# Patient Record
Sex: MALE | Race: WHITE | Employment: OTHER
[De-identification: names, ages, dates, MRNs, and addresses within clinical notes are randomized per-mention and may not be internally consistent; named-entity substitution may affect disease eponyms.]

---

## 2017-01-24 ENCOUNTER — OFFICE VISIT (OUTPATIENT)
Dept: NEUROLOGY | Facility: CLINIC | Age: 62
End: 2017-01-24

## 2017-01-24 VITALS
HEART RATE: 82 BPM | WEIGHT: 158.9 LBS | DIASTOLIC BLOOD PRESSURE: 78 MMHG | SYSTOLIC BLOOD PRESSURE: 112 MMHG | BODY MASS INDEX: 24.94 KG/M2 | HEIGHT: 67 IN

## 2017-01-24 DIAGNOSIS — G25.81 RESTLESS LEG SYNDROME, CONTROLLED: ICD-10-CM

## 2017-01-24 PROCEDURE — G8484 FLU IMMUNIZE NO ADMIN: HCPCS | Performed by: PSYCHIATRY & NEUROLOGY

## 2017-01-24 PROCEDURE — 99214 OFFICE O/P EST MOD 30 MIN: CPT | Performed by: PSYCHIATRY & NEUROLOGY

## 2017-01-24 PROCEDURE — 3017F COLORECTAL CA SCREEN DOC REV: CPT | Performed by: PSYCHIATRY & NEUROLOGY

## 2017-01-24 PROCEDURE — G8427 DOCREV CUR MEDS BY ELIG CLIN: HCPCS | Performed by: PSYCHIATRY & NEUROLOGY

## 2017-01-24 PROCEDURE — G8420 CALC BMI NORM PARAMETERS: HCPCS | Performed by: PSYCHIATRY & NEUROLOGY

## 2017-01-24 PROCEDURE — 4004F PT TOBACCO SCREEN RCVD TLK: CPT | Performed by: PSYCHIATRY & NEUROLOGY

## 2017-01-24 RX ORDER — ROPINIROLE 0.5 MG/1
TABLET, FILM COATED ORAL
Qty: 150 TABLET | Refills: 5 | Status: SHIPPED | OUTPATIENT
Start: 2017-01-24 | End: 2017-08-09 | Stop reason: SDUPTHER

## 2017-01-24 RX ORDER — TEMAZEPAM 30 MG/1
CAPSULE ORAL
Qty: 30 CAPSULE | Refills: 2 | Status: SHIPPED | OUTPATIENT
Start: 2017-01-24 | End: 2017-06-23 | Stop reason: SDUPTHER

## 2017-01-24 RX ORDER — TRAZODONE HYDROCHLORIDE 100 MG/1
TABLET ORAL
Qty: 90 TABLET | Refills: 5 | Status: SHIPPED | OUTPATIENT
Start: 2017-01-24 | End: 2017-07-28 | Stop reason: SDUPTHER

## 2017-01-24 ASSESSMENT — ENCOUNTER SYMPTOMS
BACK PAIN: 1
RESPIRATORY NEGATIVE: 1
GASTROINTESTINAL NEGATIVE: 1
EYES NEGATIVE: 1

## 2017-08-09 ENCOUNTER — OFFICE VISIT (OUTPATIENT)
Dept: NEUROLOGY | Age: 62
End: 2017-08-09
Payer: COMMERCIAL

## 2017-08-09 VITALS
SYSTOLIC BLOOD PRESSURE: 133 MMHG | WEIGHT: 166 LBS | BODY MASS INDEX: 26.06 KG/M2 | HEIGHT: 67 IN | DIASTOLIC BLOOD PRESSURE: 87 MMHG | HEART RATE: 83 BPM

## 2017-08-09 DIAGNOSIS — G25.81 RESTLESS LEG SYNDROME, CONTROLLED: Primary | ICD-10-CM

## 2017-08-09 DIAGNOSIS — M54.40 CHRONIC BILATERAL LOW BACK PAIN WITH SCIATICA, SCIATICA LATERALITY UNSPECIFIED: ICD-10-CM

## 2017-08-09 DIAGNOSIS — G89.29 CHRONIC BILATERAL LOW BACK PAIN WITH SCIATICA, SCIATICA LATERALITY UNSPECIFIED: ICD-10-CM

## 2017-08-09 DIAGNOSIS — G47.00 INSOMNIA, UNSPECIFIED TYPE: ICD-10-CM

## 2017-08-09 PROCEDURE — 3017F COLORECTAL CA SCREEN DOC REV: CPT | Performed by: PSYCHIATRY & NEUROLOGY

## 2017-08-09 PROCEDURE — G8419 CALC BMI OUT NRM PARAM NOF/U: HCPCS | Performed by: PSYCHIATRY & NEUROLOGY

## 2017-08-09 PROCEDURE — 99214 OFFICE O/P EST MOD 30 MIN: CPT | Performed by: PSYCHIATRY & NEUROLOGY

## 2017-08-09 PROCEDURE — 4004F PT TOBACCO SCREEN RCVD TLK: CPT | Performed by: PSYCHIATRY & NEUROLOGY

## 2017-08-09 PROCEDURE — G8427 DOCREV CUR MEDS BY ELIG CLIN: HCPCS | Performed by: PSYCHIATRY & NEUROLOGY

## 2017-08-09 RX ORDER — ALBUTEROL SULFATE 90 UG/1
2 AEROSOL, METERED RESPIRATORY (INHALATION) PRN
COMMUNITY

## 2017-08-09 RX ORDER — ROPINIROLE 0.5 MG/1
TABLET, FILM COATED ORAL
Qty: 150 TABLET | Refills: 5 | Status: SHIPPED | OUTPATIENT
Start: 2017-08-09 | End: 2018-02-05 | Stop reason: SDUPTHER

## 2017-08-09 RX ORDER — TRAZODONE HYDROCHLORIDE 100 MG/1
TABLET ORAL
Qty: 90 TABLET | Refills: 5 | Status: SHIPPED | OUTPATIENT
Start: 2017-08-09 | End: 2018-03-06 | Stop reason: SDUPTHER

## 2017-08-09 ASSESSMENT — ENCOUNTER SYMPTOMS
RESPIRATORY NEGATIVE: 1
EYES NEGATIVE: 1
BACK PAIN: 1
GASTROINTESTINAL NEGATIVE: 1

## 2017-08-28 RX ORDER — TEMAZEPAM 30 MG/1
CAPSULE ORAL
Qty: 30 CAPSULE | Refills: 2 | OUTPATIENT
Start: 2017-08-28 | End: 2017-11-27 | Stop reason: SDUPTHER

## 2017-11-27 RX ORDER — TEMAZEPAM 30 MG/1
CAPSULE ORAL
Qty: 30 CAPSULE | Refills: 2 | OUTPATIENT
Start: 2017-11-27 | End: 2018-02-23 | Stop reason: SDUPTHER

## 2018-02-05 DIAGNOSIS — G25.81 RESTLESS LEG SYNDROME, CONTROLLED: ICD-10-CM

## 2018-02-05 RX ORDER — ROPINIROLE 0.5 MG/1
TABLET, FILM COATED ORAL
Qty: 150 TABLET | Refills: 4 | Status: SHIPPED | OUTPATIENT
Start: 2018-02-05 | End: 2018-07-23 | Stop reason: CLARIF

## 2018-02-23 RX ORDER — TEMAZEPAM 30 MG/1
CAPSULE ORAL
Qty: 30 CAPSULE | Refills: 1 | Status: SHIPPED | OUTPATIENT
Start: 2018-02-23 | End: 2018-04-22 | Stop reason: SDUPTHER

## 2018-02-23 NOTE — TELEPHONE ENCOUNTER
Patient is calling for their monthly Tempazepam prescription. Last OARRS report was done 11/27/2017. Rx will be sent electronically by physician to the pharmacy. Yeyo's next appointment 3/6/2018.

## 2018-03-06 ENCOUNTER — OFFICE VISIT (OUTPATIENT)
Dept: NEUROLOGY | Age: 63
End: 2018-03-06
Payer: COMMERCIAL

## 2018-03-06 VITALS
HEART RATE: 92 BPM | HEIGHT: 67 IN | SYSTOLIC BLOOD PRESSURE: 134 MMHG | DIASTOLIC BLOOD PRESSURE: 87 MMHG | WEIGHT: 165.8 LBS | BODY MASS INDEX: 26.02 KG/M2

## 2018-03-06 DIAGNOSIS — G47.00 INSOMNIA, UNSPECIFIED TYPE: ICD-10-CM

## 2018-03-06 DIAGNOSIS — G25.81 RESTLESS LEG SYNDROME, CONTROLLED: Primary | ICD-10-CM

## 2018-03-06 DIAGNOSIS — G89.29 CHRONIC BILATERAL LOW BACK PAIN WITH SCIATICA, SCIATICA LATERALITY UNSPECIFIED: ICD-10-CM

## 2018-03-06 DIAGNOSIS — M54.40 CHRONIC BILATERAL LOW BACK PAIN WITH SCIATICA, SCIATICA LATERALITY UNSPECIFIED: ICD-10-CM

## 2018-03-06 PROCEDURE — G8484 FLU IMMUNIZE NO ADMIN: HCPCS | Performed by: PSYCHIATRY & NEUROLOGY

## 2018-03-06 PROCEDURE — 3017F COLORECTAL CA SCREEN DOC REV: CPT | Performed by: PSYCHIATRY & NEUROLOGY

## 2018-03-06 PROCEDURE — G8427 DOCREV CUR MEDS BY ELIG CLIN: HCPCS | Performed by: PSYCHIATRY & NEUROLOGY

## 2018-03-06 PROCEDURE — G8419 CALC BMI OUT NRM PARAM NOF/U: HCPCS | Performed by: PSYCHIATRY & NEUROLOGY

## 2018-03-06 PROCEDURE — 4004F PT TOBACCO SCREEN RCVD TLK: CPT | Performed by: PSYCHIATRY & NEUROLOGY

## 2018-03-06 PROCEDURE — 99214 OFFICE O/P EST MOD 30 MIN: CPT | Performed by: PSYCHIATRY & NEUROLOGY

## 2018-03-06 RX ORDER — ROPINIROLE 1 MG/1
TABLET, FILM COATED ORAL
Qty: 120 TABLET | Refills: 3 | Status: SHIPPED | OUTPATIENT
Start: 2018-03-06 | End: 2018-07-23 | Stop reason: CLARIF

## 2018-03-06 RX ORDER — TRAZODONE HYDROCHLORIDE 100 MG/1
TABLET ORAL
Qty: 90 TABLET | Refills: 5 | Status: SHIPPED | OUTPATIENT
Start: 2018-03-06 | End: 2018-09-02 | Stop reason: SDUPTHER

## 2018-03-06 ASSESSMENT — ENCOUNTER SYMPTOMS
ALLERGIC/IMMUNOLOGIC NEGATIVE: 1
GASTROINTESTINAL NEGATIVE: 1
EYES NEGATIVE: 1
BACK PAIN: 1
COUGH: 1

## 2018-03-06 NOTE — PROGRESS NOTES
Subjective:      Patient ID: Mckenna Thomas is a 60 y.o. . HPI    Active Problem restless legs on requip along with intiating and maintenance insomnia on desyrel and cojoint restoril  . There is chronic low back pain with prior three level instrumented fusion seeing pain clinic . The condition he reports that over the lasted three months restless legs have again aggravated during the day upon awakenign in morning taking requip 0.5 mg mg at 7:30 AM , 2:30 PM and then 3 tablets at bedtime . He reports that during night restless are doing well with restless legs being more of an issue during the day . He is tolerating requip well . He is going to bed at 8:30 PM falling asleep within 1 hour on desyrel and restoril sleeping to 5:30 AM . There will be two awakenings at night able to fall back asleep . During waking day he is rested with no fatigue or sleepiness. He reports that low back pain is moderate with large improvement with spinal cord stimulator being off tramadol being followed by pain coinic being off tramadol  . Significant medications desyrel 300 mg po qhs, restoril 30 mg po qhs ,requip 0.5 mg at 12 noon , 6PM and 1.5  mg po qhs . Testing polysomnogram apnea hypopnea index 1 episode per hour , December 2005. MRI of Head normal, January 2006 . MRI of thoracic spine with chronic compression of T5 and T8 vertebral bodies with prior augmentation , March 2016 .  MRI lumbar spine status post L4 and L5 level interbody fusions and prior laminectomies with mild to moderate central canal stenosis at L3-L4 level , June 2015      Past Medical History:   Diagnosis Date    CAD (coronary artery disease)     Depression     GERD (gastroesophageal reflux disease)     Hypercholesterolemia     Insomnia, persistent     LBP (low back pain)     Lumbar disc displacement without myelopathy     Ulcer ([facility] [address]        Past Surgical History:   Procedure Laterality Date    ABDOMEN SURGERY  2002    GI bleed, ulcers    BACK neve 2. Whitney intact to confrontation  Cranial nerve 3,4 and 6. Extraocular movements intact. Pupils equal round and reactive to light  Cranial Nerve 7. Normal exam  Sensation . Intact to pin prick  Deep tendon reflexes intact   Assessment:       1. Restless leg syndrome, controlled    2. Chronic bilateral low back pain with sciatica, sciatica laterality unspecified    3.  Insomnia, unspecified type    Will have patient increase requip to 1 mg q 7:30 AM ,1mg po q 2 PM along with 2 mg po qhs with increasing restless legs complaint continuing on current desyrel and restoril       Plan:      As above

## 2018-03-07 NOTE — COMMUNICATION BODY
Subjective:      Patient ID: Arlette Barriga is a 60 y.o. . HPI    Active Problem restless legs on requip along with intiating and maintenance insomnia on desyrel and cojoint restoril  . There is chronic low back pain with prior three level instrumented fusion seeing pain clinic . The condition he reports that over the lasted three months restless legs have again aggravated during the day upon awakenign in morning taking requip 0.5 mg mg at 7:30 AM , 2:30 PM and then 3 tablets at bedtime . He reports that during night restless are doing well with restless legs being more of an issue during the day . He is tolerating requip well . He is going to bed at 8:30 PM falling asleep within 1 hour on desyrel and restoril sleeping to 5:30 AM . There will be two awakenings at night able to fall back asleep . During waking day he is rested with no fatigue or sleepiness. He reports that low back pain is moderate with large improvement with spinal cord stimulator being off tramadol being followed by pain coinic being off tramadol  . Significant medications desyrel 300 mg po qhs, restoril 30 mg po qhs ,requip 0.5 mg at 12 noon , 6PM and 1.5  mg po qhs . Testing polysomnogram apnea hypopnea index 1 episode per hour , December 2005. MRI of Head normal, January 2006 . MRI of thoracic spine with chronic compression of T5 and T8 vertebral bodies with prior augmentation , March 2016 .  MRI lumbar spine status post L4 and L5 level interbody fusions and prior laminectomies with mild to moderate central canal stenosis at L3-L4 level , June 2015      Past Medical History:   Diagnosis Date    CAD (coronary artery disease)     Depression     GERD (gastroesophageal reflux disease)     Hypercholesterolemia     Insomnia, persistent     LBP (low back pain)     Lumbar disc displacement without myelopathy     Ulcer ([facility] [address]        Past Surgical History:   Procedure Laterality Date    ABDOMEN SURGERY  2002    GI bleed, ulcers    BACK neve 2. Whitney intact to confrontation  Cranial nerve 3,4 and 6. Extraocular movements intact. Pupils equal round and reactive to light  Cranial Nerve 7. Normal exam  Sensation . Intact to pin prick  Deep tendon reflexes intact   Assessment:       1. Restless leg syndrome, controlled    2. Chronic bilateral low back pain with sciatica, sciatica laterality unspecified    3.  Insomnia, unspecified type    Will have patient increase requip to 1 mg q 7:30 AM ,1mg po q 2 PM along with 2 mg po qhs with increasing restless legs complaint continuing on current desyrel and restoril       Plan:      As above

## 2018-04-23 RX ORDER — TEMAZEPAM 30 MG/1
CAPSULE ORAL
Qty: 30 CAPSULE | Refills: 0 | Status: SHIPPED | OUTPATIENT
Start: 2018-04-23 | End: 2018-05-23

## 2018-05-17 ENCOUNTER — OFFICE VISIT (OUTPATIENT)
Dept: NEUROLOGY | Age: 63
End: 2018-05-17
Payer: COMMERCIAL

## 2018-05-17 VITALS
BODY MASS INDEX: 26.1 KG/M2 | SYSTOLIC BLOOD PRESSURE: 150 MMHG | HEIGHT: 66 IN | HEART RATE: 85 BPM | DIASTOLIC BLOOD PRESSURE: 91 MMHG | WEIGHT: 162.4 LBS

## 2018-05-17 DIAGNOSIS — G25.81 RESTLESS LEG SYNDROME, CONTROLLED: Primary | ICD-10-CM

## 2018-05-17 DIAGNOSIS — G47.00 INSOMNIA, UNSPECIFIED TYPE: ICD-10-CM

## 2018-05-17 DIAGNOSIS — M54.40 CHRONIC BILATERAL LOW BACK PAIN WITH SCIATICA, SCIATICA LATERALITY UNSPECIFIED: ICD-10-CM

## 2018-05-17 DIAGNOSIS — G89.29 CHRONIC BILATERAL LOW BACK PAIN WITH SCIATICA, SCIATICA LATERALITY UNSPECIFIED: ICD-10-CM

## 2018-05-17 PROCEDURE — 3017F COLORECTAL CA SCREEN DOC REV: CPT | Performed by: PSYCHIATRY & NEUROLOGY

## 2018-05-17 PROCEDURE — G8419 CALC BMI OUT NRM PARAM NOF/U: HCPCS | Performed by: PSYCHIATRY & NEUROLOGY

## 2018-05-17 PROCEDURE — 4004F PT TOBACCO SCREEN RCVD TLK: CPT | Performed by: PSYCHIATRY & NEUROLOGY

## 2018-05-17 PROCEDURE — G8427 DOCREV CUR MEDS BY ELIG CLIN: HCPCS | Performed by: PSYCHIATRY & NEUROLOGY

## 2018-05-17 PROCEDURE — 99214 OFFICE O/P EST MOD 30 MIN: CPT | Performed by: PSYCHIATRY & NEUROLOGY

## 2018-05-17 RX ORDER — ROPINIROLE 2 MG/1
TABLET, FILM COATED ORAL
Qty: 90 TABLET | Refills: 5 | Status: SHIPPED | OUTPATIENT
Start: 2018-05-17 | End: 2018-10-11 | Stop reason: SDUPTHER

## 2018-05-17 RX ORDER — TEMAZEPAM 30 MG/1
CAPSULE ORAL
Qty: 30 CAPSULE | Refills: 0 | Status: SHIPPED | OUTPATIENT
Start: 2018-05-17 | End: 2018-06-19 | Stop reason: SDUPTHER

## 2018-05-17 ASSESSMENT — ENCOUNTER SYMPTOMS
EYES NEGATIVE: 1
BACK PAIN: 1
RESPIRATORY NEGATIVE: 1
ALLERGIC/IMMUNOLOGIC NEGATIVE: 1
GASTROINTESTINAL NEGATIVE: 1

## 2018-06-19 DIAGNOSIS — G47.00 INSOMNIA, UNSPECIFIED TYPE: Primary | ICD-10-CM

## 2018-06-19 RX ORDER — TEMAZEPAM 30 MG/1
CAPSULE ORAL
Qty: 30 CAPSULE | Refills: 0 | Status: SHIPPED | OUTPATIENT
Start: 2018-06-19 | End: 2018-07-19 | Stop reason: SDUPTHER

## 2018-07-19 DIAGNOSIS — G47.00 INSOMNIA, UNSPECIFIED TYPE: ICD-10-CM

## 2018-07-19 RX ORDER — TEMAZEPAM 30 MG/1
CAPSULE ORAL
Qty: 30 CAPSULE | Refills: 0 | Status: SHIPPED | OUTPATIENT
Start: 2018-07-19 | End: 2018-08-17 | Stop reason: SDUPTHER

## 2018-07-19 NOTE — TELEPHONE ENCOUNTER
Patient is calling for their monthly Tempazepam prescription. Last OARRS report was done 4/23/2018 . Rx will be sent electronically by physician to the pharmacy. Ni's next appointment is 7/23/2018.

## 2018-07-23 ENCOUNTER — OFFICE VISIT (OUTPATIENT)
Dept: NEUROLOGY | Age: 63
End: 2018-07-23
Payer: COMMERCIAL

## 2018-07-23 VITALS
WEIGHT: 162.8 LBS | HEART RATE: 98 BPM | DIASTOLIC BLOOD PRESSURE: 87 MMHG | SYSTOLIC BLOOD PRESSURE: 144 MMHG | HEIGHT: 66 IN | BODY MASS INDEX: 26.16 KG/M2

## 2018-07-23 DIAGNOSIS — G47.00 INSOMNIA, UNSPECIFIED TYPE: Primary | ICD-10-CM

## 2018-07-23 DIAGNOSIS — G89.29 CHRONIC BILATERAL LOW BACK PAIN WITH SCIATICA, SCIATICA LATERALITY UNSPECIFIED: ICD-10-CM

## 2018-07-23 DIAGNOSIS — G25.81 RESTLESS LEG SYNDROME, CONTROLLED: ICD-10-CM

## 2018-07-23 DIAGNOSIS — M54.40 CHRONIC BILATERAL LOW BACK PAIN WITH SCIATICA, SCIATICA LATERALITY UNSPECIFIED: ICD-10-CM

## 2018-07-23 PROCEDURE — 3017F COLORECTAL CA SCREEN DOC REV: CPT | Performed by: PSYCHIATRY & NEUROLOGY

## 2018-07-23 PROCEDURE — 4004F PT TOBACCO SCREEN RCVD TLK: CPT | Performed by: PSYCHIATRY & NEUROLOGY

## 2018-07-23 PROCEDURE — G8419 CALC BMI OUT NRM PARAM NOF/U: HCPCS | Performed by: PSYCHIATRY & NEUROLOGY

## 2018-07-23 PROCEDURE — G8427 DOCREV CUR MEDS BY ELIG CLIN: HCPCS | Performed by: PSYCHIATRY & NEUROLOGY

## 2018-07-23 PROCEDURE — 99214 OFFICE O/P EST MOD 30 MIN: CPT | Performed by: PSYCHIATRY & NEUROLOGY

## 2018-07-23 ASSESSMENT — ENCOUNTER SYMPTOMS
ALLERGIC/IMMUNOLOGIC NEGATIVE: 1
EYES NEGATIVE: 1
GASTROINTESTINAL NEGATIVE: 1
BACK PAIN: 1
COUGH: 1

## 2018-07-23 NOTE — COMMUNICATION BODY
Subjective:      Patient ID: Juni Martínez is a 60 y.o. . HPI    Active Problem restless legs readjusting requip with intiating and maintenance insomnia on desyrel and cojoint restoril  . There is chronic low back pain with prior three level instrumented fusion seeing pain clinic. The condition restless legs are much better with requip readjustment on 2 mg at 10 AM  3 PM and 8 PM tolerating medication well sleeping better at night with only two awakenings able to fall back asleep not havingmg restless legs at tis time either during day or night . He is tolerating requip well . He goes to bed at 9 PM falling asleep within 15 minutes on desyrel and restoril sleeping to 5 AM having two awakenings able to fall back asleep . During waking day he is rested . He continues on desyrel and restoril at bedtime unable to sleep without these medications . He reports that low back pain is moderate with large improvement with spinal cord stimulator  . Significant medications desyrel 300 mg po qhs, restoril 30 mg po qhs ,requip to 2 mg at 10AM  3PM and 8PM . Testing polysomnogram apnea hypopnea index 1 episode per hour , December 2005. MRI of Head normal, January 2006 . MRI of thoracic spine with chronic compression of T5 and T8 vertebral bodies with prior augmentation , March 2016 .  MRI lumbar spine status post L4 and L5 level interbody fusions and prior laminectomies with mild to moderate central canal stenosis at L3-L4 level , June 2015      Past Medical History:   Diagnosis Date    CAD (coronary artery disease)     Depression     GERD (gastroesophageal reflux disease)     Hypercholesterolemia     Insomnia, persistent     LBP (low back pain)     Lumbar disc displacement without myelopathy     Ulcer ([facility] [address]        Past Surgical History:   Procedure Laterality Date    ABDOMEN SURGERY  2002    GI bleed, ulcers    BACK SURGERY  2003    Dr Bharat Cesar instrumented fuson L4-L5 and L5-S1   [facility] SURGERY  December 2012 diskectomy, fusion, removal of screws    BACK SURGERY  6/2015    OTHER SURGICAL HISTORY  2002    stents       Family History   Problem Relation Age of Onset    Cancer Mother     Kidney Disease Father        Social History     Social History    Marital status:      Spouse name: N/A    Number of children: N/A    Years of education: N/A     Social History Main Topics    Smoking status: Current Every Day Smoker     Packs/day: 0.25     Types: Cigarettes    Smokeless tobacco: Never Used      Comment: smokes 6 cigarettes per day    Alcohol use 0.0 oz/week      Comment: rare    Drug use: No    Sexual activity: Not Asked     Other Topics Concern    None     Social History Narrative    None           Allergies   Allergen Reactions    Dust Mite Extract     Nsaids      Abdominal pain    Pollen Extract          Review of Systems   Constitutional: Negative. HENT: Negative. Eyes: Negative. Respiratory: Positive for cough. Cardiovascular: Negative. Gastrointestinal: Negative. Endocrine: Negative. Genitourinary: Negative. Musculoskeletal: Positive for arthralgias, back pain and gait problem. Skin: Negative. Allergic/Immunologic: Negative. Hematological: Negative. Psychiatric/Behavioral: Negative. Objective:   Physical Exam    Vitals:    07/23/18 0905   BP: (!) 144/87   Pulse: 98     Constitutional .General appearance in no acute distress    Ears/Nose/Throat. Prominent soft palate and uvula. Base of tongue large         Neck large  Respiratory . Breath sounds clear bilaterally  Cardiovascular. Regular rate and rhythm and normal heart sounds  Muskuloskeletal.Normal tone. Muscle strength grossly normal nonfocal.   Gait and station normal  Orientation and mood. Alert and oriented  Short term memory normal  Attention and concentration normal   Language and speech. Normal quality with no aphasia  Cranial neve 2. Fields intact to confrontation  Cranial nerve 3,4 and 6.

## 2018-07-23 NOTE — LETTER
Neck large  Respiratory . Breath sounds clear bilaterally  Cardiovascular. Regular rate and rhythm and normal heart sounds  Muskuloskeletal.Normal tone. Muscle strength grossly normal nonfocal.   Gait and station normal  Orientation and mood. Alert and oriented  Short term memory normal  Attention and concentration normal   Language and speech. Normal quality with no aphasia  Cranial neve 2. Fields intact to confrontation  Cranial nerve 3,4 and 6. Extraocular movements intact. Pupils equal round and reactive to light  Cranial Nerve 7. Normal exam  Sensation . Intact to pin prick  Deep tendon reflexes intact     Assessment:       1. Insomnia, unspecified type    2. Restless leg syndrome, controlled    3. Chronic bilateral low back pain with sciatica, sciatica laterality unspecified    He is to continue on current medication regimen     Plan:      As above             If you have questions, please do not hesitate to call me. I look forward to following Ni along with you.     Sincerely,        Piotr Landers MD     providers:  Thierry Carson MD  [place] [id number]  [address]: [phone]

## 2018-07-23 NOTE — PROGRESS NOTES
Subjective:      Patient ID: Pieter Meigs is a 60 y.o. . HPI    Active Problem restless legs readjusting requip with intiating and maintenance insomnia on desyrel and cojoint restoril  . There is chronic low back pain with prior three level instrumented fusion seeing pain clinic. The condition restless legs are much better with requip readjustment on 2 mg at 10 AM  3 PM and 8 PM tolerating medication well sleeping better at night with only two awakenings able to fall back asleep not havingmg restless legs at tis time either during day or night . He is tolerating requip well . He goes to bed at 9 PM falling asleep within 15 minutes on desyrel and restoril sleeping to 5 AM having two awakenings able to fall back asleep . During waking day he is rested . He continues on desyrel and restoril at bedtime unable to sleep without these medications . He reports that low back pain is moderate with large improvement with spinal cord stimulator  . Significant medications desyrel 300 mg po qhs, restoril 30 mg po qhs ,requip to 2 mg at 10AM  3PM and 8PM . Testing polysomnogram apnea hypopnea index 1 episode per hour , December 2005. MRI of Head normal, January 2006 . MRI of thoracic spine with chronic compression of T5 and T8 vertebral bodies with prior augmentation , March 2016 .  MRI lumbar spine status post L4 and L5 level interbody fusions and prior laminectomies with mild to moderate central canal stenosis at L3-L4 level , June 2015      Past Medical History:   Diagnosis Date    CAD (coronary artery disease)     Depression     GERD (gastroesophageal reflux disease)     Hypercholesterolemia     Insomnia, persistent     LBP (low back pain)     Lumbar disc displacement without myelopathy     Ulcer ([facility] [address]        Past Surgical History:   Procedure Laterality Date    ABDOMEN SURGERY  2002    GI bleed, ulcers    BACK SURGERY  2003    Dr Tsering Sierra instrumented fuson L4-L5 and L5-S1   [facility] SURGERY  December 2012 Extraocular movements intact. Pupils equal round and reactive to light  Cranial Nerve 7. Normal exam  Sensation . Intact to pin prick  Deep tendon reflexes intact     Assessment:       1. Insomnia, unspecified type    2. Restless leg syndrome, controlled    3.  Chronic bilateral low back pain with sciatica, sciatica laterality unspecified    He is to continue on current medication regimen     Plan:      As above

## 2018-08-17 DIAGNOSIS — G47.00 INSOMNIA, UNSPECIFIED TYPE: ICD-10-CM

## 2018-08-17 RX ORDER — TEMAZEPAM 30 MG/1
CAPSULE ORAL
Qty: 30 CAPSULE | Refills: 0 | Status: SHIPPED | OUTPATIENT
Start: 2018-08-17 | End: 2018-09-17 | Stop reason: SDUPTHER

## 2018-09-05 RX ORDER — TRAZODONE HYDROCHLORIDE 100 MG/1
TABLET ORAL
Qty: 90 TABLET | Refills: 4 | Status: SHIPPED | OUTPATIENT
Start: 2018-09-05 | End: 2020-07-01

## 2018-09-17 DIAGNOSIS — G47.00 INSOMNIA, UNSPECIFIED TYPE: ICD-10-CM

## 2018-09-17 RX ORDER — TEMAZEPAM 30 MG/1
CAPSULE ORAL
Qty: 30 CAPSULE | Refills: 0 | Status: SHIPPED | OUTPATIENT
Start: 2018-09-17 | End: 2018-10-11 | Stop reason: SDUPTHER

## 2018-10-11 DIAGNOSIS — G47.00 INSOMNIA, UNSPECIFIED TYPE: ICD-10-CM

## 2018-10-11 RX ORDER — ROPINIROLE 2 MG/1
TABLET, FILM COATED ORAL
Qty: 120 TABLET | Refills: 5 | Status: SHIPPED | OUTPATIENT
Start: 2018-10-11 | End: 2019-03-12 | Stop reason: SDUPTHER

## 2018-10-15 RX ORDER — TEMAZEPAM 30 MG/1
CAPSULE ORAL
Qty: 30 CAPSULE | Refills: 0 | Status: SHIPPED | OUTPATIENT
Start: 2018-10-17 | End: 2018-11-14 | Stop reason: SDUPTHER

## 2018-11-14 DIAGNOSIS — G47.00 INSOMNIA, UNSPECIFIED TYPE: ICD-10-CM

## 2018-11-14 RX ORDER — TEMAZEPAM 30 MG/1
CAPSULE ORAL
Qty: 30 CAPSULE | Refills: 0 | Status: SHIPPED | OUTPATIENT
Start: 2018-11-16 | End: 2018-12-11 | Stop reason: SDUPTHER

## 2018-12-11 DIAGNOSIS — G47.00 INSOMNIA, UNSPECIFIED TYPE: ICD-10-CM

## 2018-12-11 RX ORDER — TEMAZEPAM 30 MG/1
CAPSULE ORAL
Qty: 30 CAPSULE | Refills: 0 | Status: SHIPPED | OUTPATIENT
Start: 2018-12-16 | End: 2019-01-09 | Stop reason: SDUPTHER

## 2019-01-09 DIAGNOSIS — G47.00 INSOMNIA, UNSPECIFIED TYPE: ICD-10-CM

## 2019-01-11 RX ORDER — TEMAZEPAM 30 MG/1
CAPSULE ORAL
Qty: 30 CAPSULE | Refills: 0 | Status: SHIPPED | OUTPATIENT
Start: 2019-01-15 | End: 2019-02-08 | Stop reason: SDUPTHER

## 2019-01-23 ENCOUNTER — OFFICE VISIT (OUTPATIENT)
Dept: NEUROLOGY | Age: 64
End: 2019-01-23
Payer: COMMERCIAL

## 2019-01-23 VITALS
HEART RATE: 98 BPM | HEIGHT: 65 IN | WEIGHT: 162.4 LBS | DIASTOLIC BLOOD PRESSURE: 83 MMHG | BODY MASS INDEX: 27.06 KG/M2 | SYSTOLIC BLOOD PRESSURE: 133 MMHG

## 2019-01-23 DIAGNOSIS — G25.81 RESTLESS LEG SYNDROME, CONTROLLED: ICD-10-CM

## 2019-01-23 DIAGNOSIS — G89.29 CHRONIC BILATERAL LOW BACK PAIN WITH SCIATICA, SCIATICA LATERALITY UNSPECIFIED: ICD-10-CM

## 2019-01-23 DIAGNOSIS — G47.00 INSOMNIA, UNSPECIFIED TYPE: Primary | ICD-10-CM

## 2019-01-23 DIAGNOSIS — M54.40 CHRONIC BILATERAL LOW BACK PAIN WITH SCIATICA, SCIATICA LATERALITY UNSPECIFIED: ICD-10-CM

## 2019-01-23 PROCEDURE — G8484 FLU IMMUNIZE NO ADMIN: HCPCS | Performed by: PSYCHIATRY & NEUROLOGY

## 2019-01-23 PROCEDURE — 99214 OFFICE O/P EST MOD 30 MIN: CPT | Performed by: PSYCHIATRY & NEUROLOGY

## 2019-01-23 PROCEDURE — 3017F COLORECTAL CA SCREEN DOC REV: CPT | Performed by: PSYCHIATRY & NEUROLOGY

## 2019-01-23 PROCEDURE — 4004F PT TOBACCO SCREEN RCVD TLK: CPT | Performed by: PSYCHIATRY & NEUROLOGY

## 2019-01-23 PROCEDURE — G8419 CALC BMI OUT NRM PARAM NOF/U: HCPCS | Performed by: PSYCHIATRY & NEUROLOGY

## 2019-01-23 PROCEDURE — G8427 DOCREV CUR MEDS BY ELIG CLIN: HCPCS | Performed by: PSYCHIATRY & NEUROLOGY

## 2019-01-23 RX ORDER — TRAZODONE HYDROCHLORIDE 100 MG/1
TABLET ORAL
Qty: 90 TABLET | Refills: 5 | Status: SHIPPED | OUTPATIENT
Start: 2019-01-23 | End: 2019-07-21 | Stop reason: SDUPTHER

## 2019-01-23 ASSESSMENT — ENCOUNTER SYMPTOMS
RESPIRATORY NEGATIVE: 1
BACK PAIN: 1
ALLERGIC/IMMUNOLOGIC NEGATIVE: 1
EYES NEGATIVE: 1
GASTROINTESTINAL NEGATIVE: 1

## 2019-02-08 DIAGNOSIS — G47.00 INSOMNIA, UNSPECIFIED TYPE: ICD-10-CM

## 2019-02-12 RX ORDER — TEMAZEPAM 30 MG/1
CAPSULE ORAL
Qty: 30 CAPSULE | Refills: 0 | Status: SHIPPED | OUTPATIENT
Start: 2019-02-12 | End: 2019-03-12 | Stop reason: SDUPTHER

## 2019-03-12 DIAGNOSIS — G47.00 INSOMNIA, UNSPECIFIED TYPE: ICD-10-CM

## 2019-03-12 RX ORDER — TEMAZEPAM 30 MG/1
CAPSULE ORAL
Qty: 30 CAPSULE | Refills: 0 | Status: SHIPPED | OUTPATIENT
Start: 2019-03-12 | End: 2019-04-10 | Stop reason: SDUPTHER

## 2019-03-12 RX ORDER — ROPINIROLE 2 MG/1
TABLET, FILM COATED ORAL
Qty: 120 TABLET | Refills: 4 | Status: SHIPPED | OUTPATIENT
Start: 2019-03-12 | End: 2020-07-01

## 2019-04-10 DIAGNOSIS — G47.00 INSOMNIA, UNSPECIFIED TYPE: ICD-10-CM

## 2019-04-11 RX ORDER — TEMAZEPAM 30 MG/1
CAPSULE ORAL
Qty: 30 CAPSULE | Refills: 0 | Status: SHIPPED | OUTPATIENT
Start: 2019-04-11 | End: 2019-05-14 | Stop reason: SDUPTHER

## 2019-04-11 NOTE — TELEPHONE ENCOUNTER
HPI  August 13, 2017    HPI: Alen Matthews is a 31 year old male who complains of moderate bilateral ear pain and feeling clogged/like they need to pop onset 2 weeks ago. Pt has also had some nasal congestion. He has a hx of chronic rhinitis and usually uses Flonase but has been out of it for 2 weeks. Symptoms are constant in duration. No treatments tried. Denies fever/chills, ear drainage, HA, CP, SOB, abd pain, N/V/D, rash, or any other symptoms.     No past medical history on file.  No past surgical history on file.  Social History   Substance Use Topics     Smoking status: Never Smoker     Smokeless tobacco: Never Used     Alcohol use Yes     There is no problem list on file for this patient.    No family history on file.     Problem list, Medication list, Allergies, and Medical/Social/Surgical histories reviewed in [facility] and updated as appropriate.      Review of Systems   Constitutional: Negative for chills and fever.   HENT: Positive for ear pain. Negative for ear discharge.    Respiratory: Negative for shortness of breath.    Cardiovascular: Negative for chest pain.   Gastrointestinal: Negative for abdominal pain, diarrhea, nausea and vomiting.   Skin: Negative for rash.   Neurological: Negative for focal weakness and headaches.   All other systems reviewed and are negative.        Physical Exam   Constitutional: He is oriented to person, place, and time and well-developed, well-nourished, and in no distress.   HENT:   Head: Normocephalic and atraumatic.   Right Ear: External ear and ear canal normal. Tympanic membrane is not retracted and not bulging. A middle ear effusion is present.   Left Ear: External ear and ear canal normal. Tympanic membrane is not retracted and not bulging. A middle ear effusion is present.   Nose: Nose normal.   Mouth/Throat: Uvula is midline, oropharynx is clear and moist and mucous membranes are normal.   Cardiovascular: Normal rate, regular rhythm and normal heart sounds.   Patient is calling for their monthly Tempazepam prescription. Last OARRS report was done 3/12/2019. Rx will be sent electronically by physician to the pharmacy.   Pulmonary/Chest: Effort normal and breath sounds normal.   Musculoskeletal: Normal range of motion.   Neurological: He is alert and oriented to person, place, and time. Gait normal.   Skin: Skin is warm and dry.   Nursing note and vitals reviewed.      Vital Signs  /84  Pulse 60  Temp 97.7  F (36.5  C) (Oral)  Resp 16  Wt 256 lb (116.1 kg)  SpO2 97%     Diagnostic Test Results:  none     ASSESSMENT/PLAN      ICD-10-CM    1. Acute effusion of both middle ears H65.193       No acute otitis media, TM intact. Effusion bilaterally, recommended pt restart Flonase.    I have discussed any lab or imaging results, the patient's diagnosis, and my plan of treatment with the patient and/or family. Patient is aware to come back in if with worsening symptoms or if no relief despite treatment plan.  Patient voiced understanding and had no further questions.       Follow Up: Data Unavailable    THADDEUS Oviedo, PAShannenC  [facility]

## 2019-05-14 DIAGNOSIS — G47.00 INSOMNIA, UNSPECIFIED TYPE: ICD-10-CM

## 2019-05-14 RX ORDER — TEMAZEPAM 30 MG/1
CAPSULE ORAL
Qty: 30 CAPSULE | Refills: 0 | Status: SHIPPED | OUTPATIENT
Start: 2019-05-14 | End: 2019-06-12 | Stop reason: SDUPTHER

## 2019-06-12 DIAGNOSIS — G47.00 INSOMNIA, UNSPECIFIED TYPE: ICD-10-CM

## 2019-06-15 RX ORDER — TEMAZEPAM 30 MG/1
CAPSULE ORAL
Qty: 30 CAPSULE | Refills: 0 | Status: SHIPPED | OUTPATIENT
Start: 2019-06-15 | End: 2019-07-14

## 2019-07-16 DIAGNOSIS — G47.00 INSOMNIA, UNSPECIFIED TYPE: ICD-10-CM

## 2019-07-17 RX ORDER — TEMAZEPAM 30 MG/1
CAPSULE ORAL
Qty: 30 CAPSULE | Refills: 0 | Status: SHIPPED | OUTPATIENT
Start: 2019-07-17 | End: 2019-08-16 | Stop reason: SDUPTHER

## 2019-07-22 RX ORDER — TRAZODONE HYDROCHLORIDE 100 MG/1
TABLET ORAL
Qty: 90 TABLET | Refills: 0 | Status: SHIPPED | OUTPATIENT
Start: 2019-07-22 | End: 2019-07-31 | Stop reason: SDUPTHER

## 2019-07-31 ENCOUNTER — OFFICE VISIT (OUTPATIENT)
Dept: NEUROLOGY | Age: 64
End: 2019-07-31
Payer: MEDICARE

## 2019-07-31 VITALS
HEART RATE: 82 BPM | HEIGHT: 66 IN | BODY MASS INDEX: 26.74 KG/M2 | WEIGHT: 166.4 LBS | SYSTOLIC BLOOD PRESSURE: 159 MMHG | DIASTOLIC BLOOD PRESSURE: 97 MMHG

## 2019-07-31 DIAGNOSIS — G89.29 CHRONIC BILATERAL LOW BACK PAIN WITH SCIATICA, SCIATICA LATERALITY UNSPECIFIED: ICD-10-CM

## 2019-07-31 DIAGNOSIS — M54.40 CHRONIC BILATERAL LOW BACK PAIN WITH SCIATICA, SCIATICA LATERALITY UNSPECIFIED: ICD-10-CM

## 2019-07-31 DIAGNOSIS — G25.81 RESTLESS LEG SYNDROME, CONTROLLED: ICD-10-CM

## 2019-07-31 DIAGNOSIS — G47.00 INSOMNIA, UNSPECIFIED TYPE: Primary | ICD-10-CM

## 2019-07-31 PROCEDURE — G8427 DOCREV CUR MEDS BY ELIG CLIN: HCPCS | Performed by: PSYCHIATRY & NEUROLOGY

## 2019-07-31 PROCEDURE — G8419 CALC BMI OUT NRM PARAM NOF/U: HCPCS | Performed by: PSYCHIATRY & NEUROLOGY

## 2019-07-31 PROCEDURE — 4004F PT TOBACCO SCREEN RCVD TLK: CPT | Performed by: PSYCHIATRY & NEUROLOGY

## 2019-07-31 PROCEDURE — 99214 OFFICE O/P EST MOD 30 MIN: CPT | Performed by: PSYCHIATRY & NEUROLOGY

## 2019-07-31 PROCEDURE — 3017F COLORECTAL CA SCREEN DOC REV: CPT | Performed by: PSYCHIATRY & NEUROLOGY

## 2019-07-31 RX ORDER — ROPINIROLE 2 MG/1
TABLET, FILM COATED ORAL
Qty: 120 TABLET | Refills: 5 | Status: SHIPPED | OUTPATIENT
Start: 2019-07-31 | End: 2020-01-28

## 2019-07-31 RX ORDER — TRAZODONE HYDROCHLORIDE 100 MG/1
TABLET ORAL
Qty: 90 TABLET | Refills: 5 | Status: SHIPPED | OUTPATIENT
Start: 2019-07-31 | End: 2020-02-28 | Stop reason: SDUPTHER

## 2019-07-31 ASSESSMENT — ENCOUNTER SYMPTOMS
RESPIRATORY NEGATIVE: 1
ALLERGIC/IMMUNOLOGIC NEGATIVE: 1
BACK PAIN: 1
EYES NEGATIVE: 1
GASTROINTESTINAL NEGATIVE: 1

## 2019-07-31 NOTE — PROGRESS NOTES
Psychiatric/Behavioral: Negative. Objective:   Physical Exam    Vitals:    07/31/19 0802   BP: (!) 159/97   Pulse: 82     Constitutional .General appearance in no acute distress    Ears/Nose/Throat. Prominent soft palate and uvula. Base of tongue large         Neck large  Respiratory . Breath sounds clear bilaterally  Cardiovascular. Regular rate and rhythm and normal heart sounds  Muskuloskeletal.Normal tone. Muscle strength grossly normal nonfocal.   Gait and station normal  Orientation and mood. Alert and oriented  Short term memory normal  Attention and concentration normal   Language and speech. Normal quality with no aphasia  Cranial neve 2. Fields intact to confrontation  Cranial nerve 3,4 and 6. Extraocular movements intact. Pupils equal round and reactive to light  Cranial Nerve 7. Normal exam  Sensation . Intact to pin prick  Deep tendon reflexes intact     Assessment:       1. Insomnia, unspecified type    2. Restless leg syndrome, controlled    3.  Chronic bilateral low back pain with sciatica, sciatica laterality unspecified    Will readjust requip to 2 mg po qid with increasing restless legs to take his at  7AM , 12 noon , 5PM and hs otherwise leaving medication regimen unchanged      Plan:      As above

## 2019-08-16 DIAGNOSIS — G47.00 INSOMNIA, UNSPECIFIED TYPE: ICD-10-CM

## 2019-08-20 RX ORDER — TEMAZEPAM 30 MG/1
CAPSULE ORAL
Qty: 30 CAPSULE | Refills: 0 | Status: SHIPPED | OUTPATIENT
Start: 2019-08-20 | End: 2019-09-19 | Stop reason: SDUPTHER

## 2019-09-19 DIAGNOSIS — G47.00 INSOMNIA, UNSPECIFIED TYPE: ICD-10-CM

## 2019-09-19 RX ORDER — TEMAZEPAM 30 MG/1
CAPSULE ORAL
Qty: 30 CAPSULE | Refills: 0 | Status: SHIPPED | OUTPATIENT
Start: 2019-09-19 | End: 2019-10-22 | Stop reason: SDUPTHER

## 2019-10-22 DIAGNOSIS — G47.00 INSOMNIA, UNSPECIFIED TYPE: ICD-10-CM

## 2019-10-23 RX ORDER — TEMAZEPAM 30 MG/1
CAPSULE ORAL
Qty: 30 CAPSULE | Refills: 0 | Status: SHIPPED | OUTPATIENT
Start: 2019-10-23 | End: 2019-12-05 | Stop reason: SDUPTHER

## 2019-12-05 DIAGNOSIS — G47.00 INSOMNIA, UNSPECIFIED TYPE: ICD-10-CM

## 2019-12-05 RX ORDER — TEMAZEPAM 30 MG/1
CAPSULE ORAL
Qty: 30 CAPSULE | Refills: 0 | Status: SHIPPED | OUTPATIENT
Start: 2019-12-05 | End: 2020-01-23

## 2020-01-23 RX ORDER — TEMAZEPAM 30 MG/1
CAPSULE ORAL
Qty: 30 CAPSULE | Refills: 0 | Status: SHIPPED | OUTPATIENT
Start: 2020-01-23 | End: 2020-02-28 | Stop reason: SDUPTHER

## 2020-01-23 NOTE — TELEPHONE ENCOUNTER
Patient is calling for their Tempazepam prescription. OARRS report was done today and viewed by Dr. Noemí Archer. If/when approved Rx will be sent electronically by physician to the pharmacy.

## 2020-01-29 RX ORDER — ROPINIROLE 2 MG/1
TABLET, FILM COATED ORAL
Qty: 120 TABLET | Refills: 4 | Status: SHIPPED | OUTPATIENT
Start: 2020-01-29 | End: 2020-07-01 | Stop reason: SDUPTHER

## 2020-02-28 RX ORDER — TRAZODONE HYDROCHLORIDE 100 MG/1
TABLET ORAL
Qty: 90 TABLET | Refills: 5 | Status: SHIPPED | OUTPATIENT
Start: 2020-02-28 | End: 2020-07-01 | Stop reason: SDUPTHER

## 2020-02-28 RX ORDER — TEMAZEPAM 30 MG/1
CAPSULE ORAL
Qty: 30 CAPSULE | Refills: 0 | Status: SHIPPED | OUTPATIENT
Start: 2020-02-28 | End: 2020-07-01 | Stop reason: SDUPTHER

## 2020-02-28 NOTE — TELEPHONE ENCOUNTER
Marlena Gomez stopped in the office this afternoon asking for refills on his Trazodone 100 mg. 3 qhs amd Temazepam 30 mg qhs to [facility].  I explained Dr. Gayatri Montanez is not in the office but will send the RX on to him for approval. OARRS report was run on 1/23/2020

## 2020-03-30 RX ORDER — TEMAZEPAM 30 MG/1
CAPSULE ORAL
Qty: 30 CAPSULE | Refills: 0 | OUTPATIENT
Start: 2020-03-30

## 2020-03-30 NOTE — TELEPHONE ENCOUNTER
Patient didn't show for his 2/5/2020 follow up. Refused Rx and sent a message that the patient should contact the office.

## 2020-07-01 ENCOUNTER — TELEPHONE (OUTPATIENT)
Dept: NEUROLOGY | Age: 65
End: 2020-07-01

## 2020-07-01 ENCOUNTER — OFFICE VISIT (OUTPATIENT)
Dept: NEUROLOGY | Age: 65
End: 2020-07-01
Payer: MEDICARE

## 2020-07-01 VITALS
BODY MASS INDEX: 23.46 KG/M2 | HEIGHT: 66 IN | DIASTOLIC BLOOD PRESSURE: 87 MMHG | SYSTOLIC BLOOD PRESSURE: 137 MMHG | HEART RATE: 93 BPM | WEIGHT: 146 LBS | TEMPERATURE: 98.8 F

## 2020-07-01 PROCEDURE — 4040F PNEUMOC VAC/ADMIN/RCVD: CPT | Performed by: PSYCHIATRY & NEUROLOGY

## 2020-07-01 PROCEDURE — G8427 DOCREV CUR MEDS BY ELIG CLIN: HCPCS | Performed by: PSYCHIATRY & NEUROLOGY

## 2020-07-01 PROCEDURE — 3017F COLORECTAL CA SCREEN DOC REV: CPT | Performed by: PSYCHIATRY & NEUROLOGY

## 2020-07-01 PROCEDURE — G8420 CALC BMI NORM PARAMETERS: HCPCS | Performed by: PSYCHIATRY & NEUROLOGY

## 2020-07-01 PROCEDURE — 1123F ACP DISCUSS/DSCN MKR DOCD: CPT | Performed by: PSYCHIATRY & NEUROLOGY

## 2020-07-01 PROCEDURE — 4004F PT TOBACCO SCREEN RCVD TLK: CPT | Performed by: PSYCHIATRY & NEUROLOGY

## 2020-07-01 PROCEDURE — 99214 OFFICE O/P EST MOD 30 MIN: CPT | Performed by: PSYCHIATRY & NEUROLOGY

## 2020-07-01 RX ORDER — TEMAZEPAM 30 MG/1
CAPSULE ORAL
Qty: 30 CAPSULE | Refills: 0 | Status: SHIPPED | OUTPATIENT
Start: 2020-07-01 | End: 2020-09-11

## 2020-07-01 RX ORDER — TRAZODONE HYDROCHLORIDE 100 MG/1
TABLET ORAL
Qty: 90 TABLET | Refills: 5 | Status: SHIPPED | OUTPATIENT
Start: 2020-07-01 | End: 2021-01-22 | Stop reason: SDUPTHER

## 2020-07-01 RX ORDER — ROPINIROLE 2 MG/1
TABLET, FILM COATED ORAL
Qty: 120 TABLET | Refills: 5 | Status: SHIPPED | OUTPATIENT
Start: 2020-07-01 | End: 2020-12-28

## 2020-07-01 ASSESSMENT — ENCOUNTER SYMPTOMS
ALLERGIC/IMMUNOLOGIC NEGATIVE: 1
BACK PAIN: 1
EYES NEGATIVE: 1
GASTROINTESTINAL NEGATIVE: 1
RESPIRATORY NEGATIVE: 1

## 2020-07-01 NOTE — PROGRESS NOTES
diskectomy, fusion, removal of screws    BACK SURGERY  6/2015    CATARACT REMOVAL Right 2019    OTHER SURGICAL HISTORY  2002    stents       Family History   Problem Relation Age of Onset    Cancer Mother     Kidney Disease Father        Social History     Socioeconomic History    Marital status:      Spouse name: None    Number of children: None    Years of education: None    Highest education level: None   Occupational History    None   Social Needs    Financial resource strain: None    Food insecurity     Worry: None     Inability: None    Transportation needs     Medical: None     Non-medical: None   Tobacco Use    Smoking status: Current Every Day Smoker     Packs/day: 0.25     Types: Cigarettes    Smokeless tobacco: Never Used    Tobacco comment: smokes 4 cigarettes per day   Substance and Sexual Activity    Alcohol use: Yes     Alcohol/week: 0.0 standard drinks     Comment: rare    Drug use: No    Sexual activity: None   Lifestyle    Physical activity     Days per week: None     Minutes per session: None    Stress: None   Relationships    Social connections     Talks on phone: None     Gets together: None     Attends [employer] service: None     Active member of club or organization: None     Attends meetings of clubs or organizations: None     Relationship status: None    Intimate partner violence     Fear of current or ex partner: None     Emotionally abused: None     Physically abused: None     Forced sexual activity: None   Other Topics Concern    None   Social History Narrative    None           Allergies   Allergen Reactions    Dust Mite Extract     Nsaids      Abdominal pain    Pollen Extract          Review of Systems   Constitutional: Positive for fatigue and unexpected weight change. HENT: Negative. Eyes: Negative. Respiratory: Negative. Cardiovascular: Negative. Gastrointestinal: Negative. Endocrine: Negative.     Genitourinary: Positive for frequency. Musculoskeletal: Positive for back pain and myalgias. Skin: Negative. Allergic/Immunologic: Negative. Neurological: Positive for weakness and numbness. Hematological: Negative. Psychiatric/Behavioral: Negative. Objective:   Physical Exam  Vitals:    07/01/20 1113   BP: 137/87   Pulse: 93   Temp: 98.8 °F (37.1 °C)     Constitutional .General appearance in no acute distress    Ears/Nose/Throat. Prominent soft palate and uvula. Base of tongue large         Neck large  Respiratory . Breath sounds clear bilaterally  Cardiovascular. Regular rate and rhythm and normal heart sounds  Muskuloskeletal.Normal tone. Muscle strength grossly normal nonfocal.   Gait and station normal  Orientation and mood. Alert and oriented  Short term memory normal  Attention and concentration normal   Language and speech. Normal quality with no aphasia  Cranial neve 2. Fields intact to confrontation  Cranial nerve 3,4 and 6. Extraocular movements intact. Pupils equal round and reactive to light  Cranial Nerve 7. Normal exam  Sensation . Intact to pin prick  Deep tendon reflexes intact     Assessment:       1. Restless leg syndrome, controlled    2. Chronic bilateral low back pain with sciatica, sciatica laterality unspecified    3.  Insomnia, unspecified type    Will have him resume restoril staying on desyrel and requip therapy      Plan:      As above

## 2020-09-11 RX ORDER — TEMAZEPAM 30 MG/1
CAPSULE ORAL
Qty: 30 CAPSULE | Refills: 0 | Status: SHIPPED | OUTPATIENT
Start: 2020-09-11 | End: 2021-01-22 | Stop reason: SDUPTHER

## 2020-12-28 RX ORDER — ROPINIROLE 2 MG/1
TABLET, FILM COATED ORAL
Qty: 120 TABLET | Refills: 0 | Status: SHIPPED | OUTPATIENT
Start: 2020-12-28 | End: 2021-01-22 | Stop reason: SDUPTHER

## 2020-12-28 NOTE — TELEPHONE ENCOUNTER
Pharmacy requesting refill of Requip.       Medication active on med list yes      Date of last fill: 7/1/20   verified on 12/28/2020  verified by/ Dot Awad LPN      Date of last appointment 7/1/2020    Next Visit Date:  1/12/2021

## 2021-01-22 ENCOUNTER — OFFICE VISIT (OUTPATIENT)
Dept: NEUROLOGY | Age: 66
End: 2021-01-22
Payer: MEDICARE

## 2021-01-22 VITALS
TEMPERATURE: 97.3 F | BODY MASS INDEX: 26.03 KG/M2 | HEART RATE: 84 BPM | DIASTOLIC BLOOD PRESSURE: 80 MMHG | SYSTOLIC BLOOD PRESSURE: 130 MMHG | HEIGHT: 66 IN | WEIGHT: 162 LBS

## 2021-01-22 DIAGNOSIS — G25.81 RESTLESS LEG SYNDROME, CONTROLLED: Primary | ICD-10-CM

## 2021-01-22 DIAGNOSIS — G47.00 INSOMNIA, UNSPECIFIED TYPE: ICD-10-CM

## 2021-01-22 DIAGNOSIS — G89.29 CHRONIC BILATERAL LOW BACK PAIN WITH SCIATICA, SCIATICA LATERALITY UNSPECIFIED: ICD-10-CM

## 2021-01-22 DIAGNOSIS — M54.40 CHRONIC BILATERAL LOW BACK PAIN WITH SCIATICA, SCIATICA LATERALITY UNSPECIFIED: ICD-10-CM

## 2021-01-22 PROCEDURE — 1123F ACP DISCUSS/DSCN MKR DOCD: CPT | Performed by: PSYCHIATRY & NEUROLOGY

## 2021-01-22 PROCEDURE — G8427 DOCREV CUR MEDS BY ELIG CLIN: HCPCS | Performed by: PSYCHIATRY & NEUROLOGY

## 2021-01-22 PROCEDURE — 99214 OFFICE O/P EST MOD 30 MIN: CPT | Performed by: PSYCHIATRY & NEUROLOGY

## 2021-01-22 PROCEDURE — G8484 FLU IMMUNIZE NO ADMIN: HCPCS | Performed by: PSYCHIATRY & NEUROLOGY

## 2021-01-22 PROCEDURE — 3017F COLORECTAL CA SCREEN DOC REV: CPT | Performed by: PSYCHIATRY & NEUROLOGY

## 2021-01-22 PROCEDURE — 4004F PT TOBACCO SCREEN RCVD TLK: CPT | Performed by: PSYCHIATRY & NEUROLOGY

## 2021-01-22 PROCEDURE — 4040F PNEUMOC VAC/ADMIN/RCVD: CPT | Performed by: PSYCHIATRY & NEUROLOGY

## 2021-01-22 PROCEDURE — G8417 CALC BMI ABV UP PARAM F/U: HCPCS | Performed by: PSYCHIATRY & NEUROLOGY

## 2021-01-22 RX ORDER — TRAZODONE HYDROCHLORIDE 100 MG/1
TABLET ORAL
Qty: 90 TABLET | Refills: 5 | Status: SHIPPED | OUTPATIENT
Start: 2021-01-22 | End: 2021-07-12

## 2021-01-22 RX ORDER — ROPINIROLE 2 MG/1
TABLET, FILM COATED ORAL
Qty: 120 TABLET | Refills: 5 | Status: SHIPPED | OUTPATIENT
Start: 2021-01-22 | End: 2021-07-12

## 2021-01-22 RX ORDER — TEMAZEPAM 30 MG/1
CAPSULE ORAL
Qty: 30 CAPSULE | Refills: 0 | Status: SHIPPED | OUTPATIENT
Start: 2021-01-22 | End: 2021-03-19

## 2021-01-22 RX ORDER — GABAPENTIN 300 MG/1
CAPSULE ORAL
Qty: 60 CAPSULE | Refills: 2 | Status: SHIPPED | OUTPATIENT
Start: 2021-01-22 | End: 2021-04-15

## 2021-01-22 ASSESSMENT — ENCOUNTER SYMPTOMS
GASTROINTESTINAL NEGATIVE: 1
EYES NEGATIVE: 1
RESPIRATORY NEGATIVE: 1
BACK PAIN: 1
ALLERGIC/IMMUNOLOGIC NEGATIVE: 1

## 2021-01-22 NOTE — PROGRESS NOTES
Subjective:      Patient ID: Karla Phalen is a 76 y.o. . HPI  Active Problem restless legs on requip with intiating and maintenance insomnia on desyrel and cojoint restoril  . There is chronic low back pain with prior three level instrumented fusion seeing pain clinic. The condition he reports that restless legs are more of an issue during day and night. He is on requip to 2 mg q9AM , 2 mg po q4PM and 4 mg po  hs. He is going to bed at 10 PM falling asleep within 15 minutes sleeping 1 1/2 hours then legs start jumping with arousal pattern able to fall back asleep . He is up at 5 AM . He reports that by 10:30 to 11 AM on sitting down he will have restless legs . Requip will attenuate restles legs by 40 % . As long as he is on he is on hisfeet he is fine by report . He reports continues on restoril and desyrel at bedtime  . During waking day he is rested. He has spinal cord stimulator keepin low back pain at grade 3 to 4 over 10 with some radiation down ether leg  . Significant medications desyrel 300 mg po qhs, restoril 30 mg po qhs , requip to 2 mg q9AM , 2 mg po q4PM and 4 mg po  hs. Testing polysomnogram apnea hypopnea index 1 episode per hour , December 2005. MRI of Head normal, January 2006 . MRI of thoracic spine with chronic compression of T5 and T8 vertebral bodies with prior augmentation , March 2016 .  MRI lumbar spine status post L4 and L5 level interbody fusions and prior laminectomies with mild to moderate central canal stenosis at L3-L4 level , June 2015      Past Medical History:   Diagnosis Date    CAD (coronary artery disease)     Depression     GERD (gastroesophageal reflux disease)     Hypercholesterolemia     Insomnia, persistent     LBP (low back pain)     Lumbar disc displacement without myelopathy     Ulcer        Past Surgical History:   Procedure Laterality Date    ABDOMEN SURGERY  2002    GI bleed, ulcers    BACK SURGERY  2003    Dr Prema Seay instrumented fuson L4-L5 and L5-S1  BACK SURGERY  December 2012    diskectomy, fusion, removal of screws    BACK SURGERY  6/2015    CATARACT REMOVAL Right 2019    OTHER SURGICAL HISTORY  2002    stents       Family History   Problem Relation Age of Onset    Cancer Mother     Kidney Disease Father        Social History     Socioeconomic History    Marital status:      Spouse name: None    Number of children: None    Years of education: None    Highest education level: None   Occupational History    None   Social Needs    Financial resource strain: None    Food insecurity     Worry: None     Inability: None    Transportation needs     Medical: None     Non-medical: None   Tobacco Use    Smoking status: Current Every Day Smoker     Packs/day: 0.25     Years: 15.00     Pack years: 3.75     Types: Cigarettes    Smokeless tobacco: Never Used    Tobacco comment: smokes 4 cigarettes per day   Substance and Sexual Activity    Alcohol use: Yes     Alcohol/week: 0.0 standard drinks     Comment: rare    Drug use: No    Sexual activity: None   Lifestyle    Physical activity     Days per week: None     Minutes per session: None    Stress: None   Relationships    Social connections     Talks on phone: None     Gets together: None     Attends Christianity service: None     Active member of club or organization: None     Attends meetings of clubs or organizations: None     Relationship status: None    Intimate partner violence     Fear of current or ex partner: None     Emotionally abused: None     Physically abused: None     Forced sexual activity: None   Other Topics Concern    None   Social History Narrative    None           Allergies   Allergen Reactions    Dust Mite Extract     Nsaids      Abdominal pain    Pollen Extract          Review of Systems   Constitutional: Positive for fatigue and unexpected weight change. HENT: Negative. Eyes: Negative. Respiratory: Negative. Cardiovascular: Negative. Gastrointestinal: Negative. Endocrine: Negative. Genitourinary: Positive for frequency. Musculoskeletal: Positive for back pain and myalgias. Skin: Negative. Allergic/Immunologic: Negative. Neurological: Positive for weakness and numbness. Hematological: Negative. Psychiatric/Behavioral: Negative. Objective:   Physical Exam  Vitals:    01/22/21 0839   BP: 130/80   Pulse: 84   Temp: 97.3 °F (36.3 °C)     Constitutional .General appearance in no acute distress    Ears/Nose/Throat. Prominent soft palate and uvula. Base of tongue large         Neck large  Respiratory . Breath sounds clear bilaterally  Cardiovascular. Regular rate and rhythm and normal heart sounds  Muskuloskeletal.Normal tone. Muscle strength grossly normal nonfocal.   Gait and station normal  Orientation and mood. Alert and oriented  Short term memory normal  Attention and concentration normal   Language and speech. Normal quality with no aphasia  Cranial neve 2. Fields intact to confrontation  Cranial nerve 3,4 and 6. Extraocular movements intact. Pupils equal round and reactive to light  Cranial Nerve 7. Normal exam  Sensation . Intact to pin prick  Deep tendon reflexes intact     Assessment:       1. Restless leg syndrome, controlled    2. Insomnia, unspecified type    3.  Chronic bilateral low back pain with sciatica, sciatica laterality unspecified    Will add to requip neurontin 300 mg po bid for restless legs     Plan:      As above

## 2021-02-05 ENCOUNTER — TELEPHONE (OUTPATIENT)
Dept: NEUROLOGY | Age: 66
End: 2021-02-05

## 2021-02-23 ENCOUNTER — OFFICE VISIT (OUTPATIENT)
Dept: NEUROLOGY | Age: 66
End: 2021-02-23
Payer: MEDICARE

## 2021-02-23 VITALS
TEMPERATURE: 98.4 F | HEART RATE: 72 BPM | WEIGHT: 177 LBS | HEIGHT: 66 IN | BODY MASS INDEX: 28.45 KG/M2 | SYSTOLIC BLOOD PRESSURE: 140 MMHG | DIASTOLIC BLOOD PRESSURE: 80 MMHG

## 2021-02-23 DIAGNOSIS — G89.29 CHRONIC BILATERAL LOW BACK PAIN WITH SCIATICA, SCIATICA LATERALITY UNSPECIFIED: ICD-10-CM

## 2021-02-23 DIAGNOSIS — G47.00 INSOMNIA, UNSPECIFIED TYPE: ICD-10-CM

## 2021-02-23 DIAGNOSIS — G25.81 RESTLESS LEG SYNDROME, CONTROLLED: Primary | ICD-10-CM

## 2021-02-23 DIAGNOSIS — M54.40 CHRONIC BILATERAL LOW BACK PAIN WITH SCIATICA, SCIATICA LATERALITY UNSPECIFIED: ICD-10-CM

## 2021-02-23 PROCEDURE — G8417 CALC BMI ABV UP PARAM F/U: HCPCS | Performed by: PSYCHIATRY & NEUROLOGY

## 2021-02-23 PROCEDURE — 4040F PNEUMOC VAC/ADMIN/RCVD: CPT | Performed by: PSYCHIATRY & NEUROLOGY

## 2021-02-23 PROCEDURE — 4004F PT TOBACCO SCREEN RCVD TLK: CPT | Performed by: PSYCHIATRY & NEUROLOGY

## 2021-02-23 PROCEDURE — 99214 OFFICE O/P EST MOD 30 MIN: CPT | Performed by: PSYCHIATRY & NEUROLOGY

## 2021-02-23 PROCEDURE — G8484 FLU IMMUNIZE NO ADMIN: HCPCS | Performed by: PSYCHIATRY & NEUROLOGY

## 2021-02-23 PROCEDURE — 3017F COLORECTAL CA SCREEN DOC REV: CPT | Performed by: PSYCHIATRY & NEUROLOGY

## 2021-02-23 PROCEDURE — G8427 DOCREV CUR MEDS BY ELIG CLIN: HCPCS | Performed by: PSYCHIATRY & NEUROLOGY

## 2021-02-23 PROCEDURE — 1123F ACP DISCUSS/DSCN MKR DOCD: CPT | Performed by: PSYCHIATRY & NEUROLOGY

## 2021-02-23 RX ORDER — AMLODIPINE BESYLATE 5 MG/1
5 TABLET ORAL DAILY
COMMUNITY

## 2021-02-23 RX ORDER — TRAMADOL HYDROCHLORIDE 50 MG/1
50 TABLET ORAL EVERY 8 HOURS PRN
COMMUNITY

## 2021-02-23 RX ORDER — ATORVASTATIN CALCIUM 10 MG/1
10 TABLET, FILM COATED ORAL DAILY
COMMUNITY

## 2021-02-23 ASSESSMENT — ENCOUNTER SYMPTOMS
GASTROINTESTINAL NEGATIVE: 1
EYES NEGATIVE: 1
ALLERGIC/IMMUNOLOGIC NEGATIVE: 1
RESPIRATORY NEGATIVE: 1
BACK PAIN: 1

## 2021-02-23 NOTE — PROGRESS NOTES
Subjective:      Patient ID: Jamil Ibarra is a 76 y.o. . HPI  Active Problem restless legs on requip adding neurontin with intiating and maintenance insomnia on desyrel and cojoint restoril  . There is chronic low back pain with prior three level instrumented fusion seeing pain clinic. The condition is restless legs are doing a lot better with neurontin addition day and night 300 mg po bid tolerating his medication well . There will be some restless legs breakthrough during day although minor not disruptive . He is also sleeping at night he. He is on requip to 2 mg q9AM , 2 mg po q4PM and 4 mg po  hs. He is going to bed at 8 to 8:30 PM falling asleep within 15 minutes sleeping to 5 AM . He reports 2 to 3 awakening able to fall back asleep . He reports continues on restoril and desyrel at bedtime . During waking day he is rested. He was having more low back pain having routine yearly CT of lung showing compression fracture of T7 and T11 to undergo kyphoplasty through Dr Elaine Lepe . He has spinal cord stimulator keepin low back with some pain radiation down ether leg  . Significant medications neurontin 300 mg po bid, desyrel 300 mg po qhs, restoril 30 mg po qhs , requip to 2 mg q9AM , 2 mg po q4PM and 4 mg po  hs. Testing polysomnogram apnea hypopnea index 1 episode per hour , December 2005. MRI of Head normal, January 2006 . MRI of thoracic spine with chronic compression of T5 and T8 vertebral bodies with prior augmentation , March 2016 .  MRI lumbar spine status post L4 and L5 level interbody fusions and prior laminectomies with mild to moderate central canal stenosis at L3-L4 level , June 2015      Past Medical History:   Diagnosis Date    CAD (coronary artery disease)     Depression     GERD (gastroesophageal reflux disease)     Hypercholesterolemia     Insomnia, persistent     LBP (low back pain)     Lumbar disc displacement without myelopathy     Ulcer        Past Surgical History:   Procedure Laterality Date    ABDOMEN SURGERY  2002    GI bleed, ulcers    BACK SURGERY  2003    Dr Estefany Cueva instrumented fuson L4-L5 and L5-S1   [facility]  December 2012    diskectomy, fusion, removal of screws    BACK SURGERY  6/2015    CATARACT REMOVAL Right 2019    OTHER SURGICAL HISTORY  2002    stents       Family History   Problem Relation Age of Onset    Cancer Mother     Kidney Disease Father        Social History     Socioeconomic History    Marital status:      Spouse name: None    Number of children: None    Years of education: None    Highest education level: None   Occupational History    None   Social Needs    Financial resource strain: None    Food insecurity     Worry: None     Inability: None    Transportation needs     Medical: None     Non-medical: None   Tobacco Use    Smoking status: Current Every Day Smoker     Packs/day: 0.25     Years: 15.00     Pack years: 3.75     Types: Cigarettes    Smokeless tobacco: Never Used    Tobacco comment: smokes 4 cigarettes per day   Substance and Sexual Activity    Alcohol use:  Yes     Alcohol/week: 0.0 standard drinks     Comment: rare    Drug use: No    Sexual activity: None   Lifestyle    Physical activity     Days per week: None     Minutes per session: None    Stress: None   Relationships    Social connections     Talks on phone: None     Gets together: None     Attends Nondenominational service: None     Active member of club or organization: None     Attends meetings of clubs or organizations: None     Relationship status: None    Intimate partner violence     Fear of current or ex partner: None     Emotionally abused: None     Physically abused: None     Forced sexual activity: None   Other Topics Concern    None   Social History Narrative    None           Allergies   Allergen Reactions    Dust Mite Extract     Nsaids      Abdominal pain    Pollen Extract          Review of Systems   Constitutional: Positive for fatigue and unexpected weight change. HENT: Negative. Eyes: Negative. Respiratory: Negative. Cardiovascular: Negative. Gastrointestinal: Negative. Endocrine: Negative. Genitourinary: Positive for frequency. Musculoskeletal: Positive for back pain and myalgias. Skin: Negative. Allergic/Immunologic: Negative. Neurological: Positive for weakness and numbness. Hematological: Negative. Psychiatric/Behavioral: Negative. Objective:   Physical Exam  Vitals:    02/23/21 0921   BP: (!) 140/80   Pulse: 72   Temp:      Constitutional .General appearance in no acute distress    Ears/Nose/Throat. Prominent soft palate and uvula. Base of tongue large         Neck large  Respiratory . Breath sounds clear bilaterally  Cardiovascular. Regular rate and rhythm and normal heart sounds  Muskuloskeletal.Normal tone. Muscle strength grossly normal nonfocal.   Gait and station normal  Orientation and mood. Alert and oriented  Short term memory normal  Attention and concentration normal   Language and speech. Normal quality with no aphasia  Cranial neve 2. Fields intact to confrontation  Cranial nerve 3,4 and 6. Extraocular movements intact. Pupils equal round and reactive to light  Cranial Nerve 7. Normal exam  Sensation . Intact to pin prick  Deep tendon reflexes intact     Assessment:       1. Restless leg syndrome, controlled    2. Insomnia, unspecified type    3.  Chronic bilateral low back pain with sciatica, sciatica laterality unspecified    He is to continue current regimen     Plan:      As above

## 2021-03-19 DIAGNOSIS — G47.00 INSOMNIA, UNSPECIFIED TYPE: ICD-10-CM

## 2021-03-19 RX ORDER — TEMAZEPAM 30 MG/1
CAPSULE ORAL
Qty: 30 CAPSULE | Refills: 0 | Status: SHIPPED | OUTPATIENT
Start: 2021-03-19 | End: 2021-04-15

## 2021-03-19 NOTE — TELEPHONE ENCOUNTER
Pharmacy requesting refill of Temazepam.      Medication active on med list:no, however Dr. Santhosh Beard does mention that the patient is using in his last office note dated 2/23/2021      Date of last fill: 1/22/21 for #30 NR  verified on 3/19/2021    verified by Ingrid Acevedo LPN      Date of last appointment 2/23/2021    Next Visit Date:  8/23/2021

## 2021-05-11 DIAGNOSIS — G47.00 INSOMNIA, UNSPECIFIED TYPE: ICD-10-CM

## 2021-05-12 RX ORDER — TEMAZEPAM 30 MG/1
CAPSULE ORAL
Qty: 30 CAPSULE | Refills: 0 | Status: SHIPPED | OUTPATIENT
Start: 2021-05-15 | End: 2021-06-21

## 2021-05-12 NOTE — TELEPHONE ENCOUNTER
Pharmacy requesting refill of Temazepam.      Medication active on med list: yes      Date of last fill: 4/15/21 for #30 NR  NEXT RX DUE: 5/15/2021    verified on 5/12/2021    verified by Nelson Francis LPN      Date of last appointment: 2/23/2021    Next Visit Date:  8/23/2021

## 2021-06-18 DIAGNOSIS — G47.00 INSOMNIA, UNSPECIFIED TYPE: ICD-10-CM

## 2021-06-21 RX ORDER — TEMAZEPAM 30 MG/1
CAPSULE ORAL
Qty: 30 CAPSULE | Refills: 0 | Status: SHIPPED | OUTPATIENT
Start: 2021-06-21 | End: 2021-08-05

## 2021-06-21 NOTE — TELEPHONE ENCOUNTER
Pharmacy requesting refill of temazepam.      Medication active on med list yes      Date of last fill: 5/15/21  with 0 refills verified on 6/21/21  verified by JESENIA RN      Date of last appointment 2/23/21    Next Visit Date:  8/23/2021

## 2021-07-12 RX ORDER — ROPINIROLE 2 MG/1
TABLET, FILM COATED ORAL
Qty: 120 TABLET | Refills: 1 | Status: SHIPPED | OUTPATIENT
Start: 2021-07-12 | End: 2021-09-08

## 2021-07-12 RX ORDER — TRAZODONE HYDROCHLORIDE 100 MG/1
TABLET ORAL
Qty: 90 TABLET | Refills: 1 | Status: SHIPPED | OUTPATIENT
Start: 2021-07-12 | End: 2021-10-12

## 2021-08-31 DIAGNOSIS — G89.29 CHRONIC BILATERAL LOW BACK PAIN WITH SCIATICA, SCIATICA LATERALITY UNSPECIFIED: Primary | ICD-10-CM

## 2021-08-31 DIAGNOSIS — M54.40 CHRONIC BILATERAL LOW BACK PAIN WITH SCIATICA, SCIATICA LATERALITY UNSPECIFIED: Primary | ICD-10-CM

## 2021-08-31 NOTE — TELEPHONE ENCOUNTER
Pharmacy requesting a  refill of: Gabapentin 300mg     Medication active on med list yes     Date of last prescription: 04/15/2021  with 2  refills verified on 08/31/2021  verified by JOAQUÍN BYRD     Date of last appointment: 02/23/2021     Next Visit Date:  10/13/2021

## 2021-09-01 RX ORDER — GABAPENTIN 300 MG/1
CAPSULE ORAL
Qty: 60 CAPSULE | Refills: 5 | Status: SHIPPED | OUTPATIENT
Start: 2021-09-01 | End: 2021-10-13

## 2021-10-13 ENCOUNTER — OFFICE VISIT (OUTPATIENT)
Dept: NEUROLOGY | Age: 66
End: 2021-10-13
Payer: MEDICARE

## 2021-10-13 VITALS
DIASTOLIC BLOOD PRESSURE: 84 MMHG | SYSTOLIC BLOOD PRESSURE: 149 MMHG | BODY MASS INDEX: 26.99 KG/M2 | HEART RATE: 69 BPM | HEIGHT: 65 IN | WEIGHT: 162 LBS

## 2021-10-13 DIAGNOSIS — G47.00 INSOMNIA, UNSPECIFIED TYPE: ICD-10-CM

## 2021-10-13 DIAGNOSIS — G25.81 RESTLESS LEG SYNDROME, CONTROLLED: Primary | ICD-10-CM

## 2021-10-13 DIAGNOSIS — G89.29 CHRONIC BILATERAL LOW BACK PAIN WITH SCIATICA, SCIATICA LATERALITY UNSPECIFIED: ICD-10-CM

## 2021-10-13 DIAGNOSIS — M54.40 CHRONIC BILATERAL LOW BACK PAIN WITH SCIATICA, SCIATICA LATERALITY UNSPECIFIED: ICD-10-CM

## 2021-10-13 PROCEDURE — G8427 DOCREV CUR MEDS BY ELIG CLIN: HCPCS | Performed by: PSYCHIATRY & NEUROLOGY

## 2021-10-13 PROCEDURE — 99214 OFFICE O/P EST MOD 30 MIN: CPT | Performed by: PSYCHIATRY & NEUROLOGY

## 2021-10-13 PROCEDURE — G8417 CALC BMI ABV UP PARAM F/U: HCPCS | Performed by: PSYCHIATRY & NEUROLOGY

## 2021-10-13 PROCEDURE — 4004F PT TOBACCO SCREEN RCVD TLK: CPT | Performed by: PSYCHIATRY & NEUROLOGY

## 2021-10-13 PROCEDURE — 4040F PNEUMOC VAC/ADMIN/RCVD: CPT | Performed by: PSYCHIATRY & NEUROLOGY

## 2021-10-13 PROCEDURE — G8484 FLU IMMUNIZE NO ADMIN: HCPCS | Performed by: PSYCHIATRY & NEUROLOGY

## 2021-10-13 PROCEDURE — 1123F ACP DISCUSS/DSCN MKR DOCD: CPT | Performed by: PSYCHIATRY & NEUROLOGY

## 2021-10-13 PROCEDURE — 3017F COLORECTAL CA SCREEN DOC REV: CPT | Performed by: PSYCHIATRY & NEUROLOGY

## 2021-10-13 RX ORDER — ROPINIROLE 2 MG/1
TABLET, FILM COATED ORAL
Qty: 120 TABLET | Refills: 5 | Status: SHIPPED | OUTPATIENT
Start: 2021-10-13 | End: 2022-05-18

## 2021-10-13 RX ORDER — LIDOCAINE 50 MG/G
PATCH TOPICAL
COMMUNITY
Start: 2021-04-20

## 2021-10-13 RX ORDER — PRAMIPEXOLE DIHYDROCHLORIDE 0.12 MG/1
TABLET ORAL
COMMUNITY
Start: 2021-01-24 | End: 2021-10-13 | Stop reason: CLARIF

## 2021-10-13 RX ORDER — LORATADINE 10 MG/1
10 TABLET ORAL DAILY
COMMUNITY
Start: 2021-02-19 | End: 2021-10-13

## 2021-10-13 RX ORDER — GABAPENTIN 600 MG/1
600 TABLET ORAL 3 TIMES DAILY
Qty: 90 TABLET | Refills: 2 | Status: SHIPPED | OUTPATIENT
Start: 2021-10-13 | End: 2022-04-26

## 2021-10-13 RX ORDER — NICOTINE 21 MG/24HR
1 PATCH, TRANSDERMAL 24 HOURS TRANSDERMAL EVERY 24 HOURS
COMMUNITY

## 2021-10-13 RX ORDER — PANTOPRAZOLE SODIUM 40 MG/1
40 TABLET, DELAYED RELEASE ORAL DAILY
COMMUNITY
Start: 2021-05-27

## 2021-10-13 RX ORDER — TRAZODONE HYDROCHLORIDE 100 MG/1
TABLET ORAL
Qty: 90 TABLET | Refills: 5 | Status: SHIPPED | OUTPATIENT
Start: 2021-10-13 | End: 2022-04-22

## 2021-10-13 ASSESSMENT — ENCOUNTER SYMPTOMS
BACK PAIN: 1
EYES NEGATIVE: 1
ALLERGIC/IMMUNOLOGIC NEGATIVE: 1
GASTROINTESTINAL NEGATIVE: 1
RESPIRATORY NEGATIVE: 1

## 2021-10-13 NOTE — PROGRESS NOTES
GERD (gastroesophageal reflux disease)     Hypercholesterolemia     Insomnia, persistent     LBP (low back pain)     Lumbar disc displacement without myelopathy     Ulcer        Past Surgical History:   Procedure Laterality Date    ABDOMEN SURGERY  2002    GI bleed, ulcers    BACK SURGERY  2003    Dr Warren Canada instrumented fuson L4-L5 and L5-S1    BACK SURGERY  December 2012    diskectomy, fusion, removal of screws    BACK SURGERY  6/2015    CATARACT REMOVAL Right 2019    OTHER SURGICAL HISTORY  2002    stents       Family History   Problem Relation Age of Onset    Cancer Mother     Kidney Disease Father        Social History     Socioeconomic History    Marital status:      Spouse name: None    Number of children: None    Years of education: None    Highest education level: None   Occupational History    None   Tobacco Use    Smoking status: Current Every Day Smoker     Packs/day: 0.25     Years: 15.00     Pack years: 3.75     Types: Cigarettes    Smokeless tobacco: Never Used    Tobacco comment: smokes 4 cigarettes per day   Vaping Use    Vaping Use: Never used   Substance and Sexual Activity    Alcohol use: Yes     Alcohol/week: 0.0 standard drinks     Comment: rare    Drug use: No    Sexual activity: None   Other Topics Concern    None   Social History Narrative    None     Social Determinants of Health     Financial Resource Strain:     Difficulty of Paying Living Expenses:    Food Insecurity:     Worried About Running Out of Food in the Last Year:     Ran Out of Food in the Last Year:    Transportation Needs:     Lack of Transportation (Medical):      Lack of Transportation (Non-Medical):    Physical Activity:     Days of Exercise per Week:     Minutes of Exercise per Session:    Stress:     Feeling of Stress :    Social Connections:     Frequency of Communication with Friends and Family:     Frequency of Social Gatherings with Friends and Family:     Attends Taoism Services:     Active Member of Clubs or Organizations:     Attends Club or Organization Meetings:     Marital Status:    Intimate Partner Violence:     Fear of Current or Ex-Partner:     Emotionally Abused:     Physically Abused:     Sexually Abused: Allergies   Allergen Reactions    Dust Mite Extract     Nsaids      Abdominal pain    Pollen Extract          Review of Systems   Constitutional: Positive for fatigue and unexpected weight change. HENT: Negative. Eyes: Negative. Respiratory: Negative. Cardiovascular: Negative. Gastrointestinal: Negative. Endocrine: Negative. Genitourinary: Positive for frequency. Musculoskeletal: Positive for back pain and myalgias. Skin: Negative. Allergic/Immunologic: Negative. Neurological: Positive for weakness and numbness. Hematological: Negative. Psychiatric/Behavioral: Negative. Objective:   Physical Exam  Vitals:    10/13/21 0852   BP: (!) 149/84   Pulse: 69     Constitutional .General appearance in no acute distress    Ears/Nose/Throat. Prominent soft palate and uvula. Base of tongue large         Neck large  Respiratory . Breath sounds clear bilaterally  Cardiovascular. Regular rate and rhythm and normal heart sounds  Muskuloskeletal.Normal tone. Muscle strength grossly normal nonfocal.   Gait and station normal  Orientation and mood. Alert and oriented  Short term memory normal  Attention and concentration normal   Language and speech. Normal quality with no aphasia  Cranial neve 2. Fields intact to confrontation  Cranial nerve 3,4 and 6. Extraocular movements intact. Pupils equal round and reactive to light  Cranial Nerve 7. Normal exam  Sensation . Intact to pin prick  Deep tendon reflexes intact     Assessment:       1. Restless leg syndrome, controlled    2. Chronic bilateral low back pain with sciatica, sciatica laterality unspecified    3.  Insomnia, unspecified type    Will increase neurontin to 600 mg po tid for restless legs to leave regimen otherwise unchanged      Plan:      As above

## 2021-12-08 DIAGNOSIS — G47.00 INSOMNIA, UNSPECIFIED TYPE: ICD-10-CM

## 2021-12-08 NOTE — TELEPHONE ENCOUNTER
Note prescription due 12/12/2021.     Pharmacy requesting a  refill of: Restoril 30mg     Medication active on med list yes     Date of last prescription: 11/12/2021  with 0  refills verified on 12/10/2021  verified by JOAQUÍN BYRD     Date of last appointment: 10/13/2021     Next Visit Date: 12/16/2021

## 2021-12-10 RX ORDER — TEMAZEPAM 30 MG/1
CAPSULE ORAL
Qty: 30 CAPSULE | Refills: 0 | Status: SHIPPED | OUTPATIENT
Start: 2021-12-10 | End: 2022-01-12

## 2021-12-30 ENCOUNTER — OFFICE VISIT (OUTPATIENT)
Dept: NEUROLOGY | Age: 66
End: 2021-12-30
Payer: MEDICARE

## 2021-12-30 VITALS
BODY MASS INDEX: 27.66 KG/M2 | TEMPERATURE: 98.4 F | HEART RATE: 94 BPM | WEIGHT: 166 LBS | SYSTOLIC BLOOD PRESSURE: 152 MMHG | HEIGHT: 65 IN | DIASTOLIC BLOOD PRESSURE: 92 MMHG

## 2021-12-30 DIAGNOSIS — G47.00 INSOMNIA, UNSPECIFIED TYPE: ICD-10-CM

## 2021-12-30 DIAGNOSIS — G89.29 CHRONIC BILATERAL LOW BACK PAIN WITH SCIATICA, SCIATICA LATERALITY UNSPECIFIED: ICD-10-CM

## 2021-12-30 DIAGNOSIS — G25.81 RESTLESS LEG SYNDROME, CONTROLLED: Primary | ICD-10-CM

## 2021-12-30 DIAGNOSIS — M54.40 CHRONIC BILATERAL LOW BACK PAIN WITH SCIATICA, SCIATICA LATERALITY UNSPECIFIED: ICD-10-CM

## 2021-12-30 PROCEDURE — 1123F ACP DISCUSS/DSCN MKR DOCD: CPT | Performed by: PSYCHIATRY & NEUROLOGY

## 2021-12-30 PROCEDURE — 4004F PT TOBACCO SCREEN RCVD TLK: CPT | Performed by: PSYCHIATRY & NEUROLOGY

## 2021-12-30 PROCEDURE — 4040F PNEUMOC VAC/ADMIN/RCVD: CPT | Performed by: PSYCHIATRY & NEUROLOGY

## 2021-12-30 PROCEDURE — G8417 CALC BMI ABV UP PARAM F/U: HCPCS | Performed by: PSYCHIATRY & NEUROLOGY

## 2021-12-30 PROCEDURE — G8484 FLU IMMUNIZE NO ADMIN: HCPCS | Performed by: PSYCHIATRY & NEUROLOGY

## 2021-12-30 PROCEDURE — G8427 DOCREV CUR MEDS BY ELIG CLIN: HCPCS | Performed by: PSYCHIATRY & NEUROLOGY

## 2021-12-30 PROCEDURE — 99214 OFFICE O/P EST MOD 30 MIN: CPT | Performed by: PSYCHIATRY & NEUROLOGY

## 2021-12-30 PROCEDURE — 3017F COLORECTAL CA SCREEN DOC REV: CPT | Performed by: PSYCHIATRY & NEUROLOGY

## 2021-12-30 ASSESSMENT — ENCOUNTER SYMPTOMS
BACK PAIN: 1
ALLERGIC/IMMUNOLOGIC NEGATIVE: 1
RESPIRATORY NEGATIVE: 1
EYES NEGATIVE: 1
GASTROINTESTINAL NEGATIVE: 1

## 2021-12-30 NOTE — PROGRESS NOTES
Subjective:      Patient ID: Sunday Mcneal is a 78 y.o. . HPI  Active Problem restless legs on requip readjusting neurontin with intiating and maintenance insomnia on desyrel and cojoint restoril  . There is chronic low back pain with prior three level instrumented fusion seeing pain clinic. The condition is he reports that restless legs are much better with neurontin medication readjustment having only mild mild breakthrough of restless legs in evening . During the night his wife is not complaining of him kicking anymore   . He is on neurontin 600 mg po bid at 8AM at 3 PM with 8PM which is his bedtime and requip to 2 mg q8AM , 2 mg po q 3PM and 4 mg po hs . He is tolerating medication well. He is going to bed at 8:30 falling asleep within 10 minutes sleeping to 5:30 AM . He is using desyrel 300 mg po qhs and restoril 30 mg po qhs having attempted simplification of this regimen unsuccessfully in the past . There will be one awakeing to go to bathroom able to fall back asleep. He has low back pain which is there all the time in midlle and low back at grade 8 over 10 using tramadol  50 mg po tid through PMD . There has been compression fracture of T7 and T11 to undergo kyphoplasty through Dr Lázaro Davidson . He has spinal cord stimulator keepin low back with some pain radiation down ether leg  . Significant medications neurontin 600 mg po tid, desyrel 300 mg po qhs, restoril 30 mg po qhs , requip to 2 mg q 9AM , 2 mg po q4PM and 4 mg po qhs. Tramadol 50 mg po tid . Testing polysomnogram apnea hypopnea index 1 episode per hour , December 2005. MRI of Head normal, January 2006 . MRI of thoracic spine with chronic compression of T5 and T8 vertebral bodies with prior augmentation , March 2016 .  MRI lumbar spine status post L4 and L5 level interbody fusions and prior laminectomies with mild to moderate central canal stenosis at L3-L4 level , June 2015      Past Medical History:   Diagnosis Date    CAD (coronary artery disease)     Depression     GERD (gastroesophageal reflux disease)     Hypercholesterolemia     Insomnia, persistent     LBP (low back pain)     Lumbar disc displacement without myelopathy     Ulcer        Past Surgical History:   Procedure Laterality Date    ABDOMEN SURGERY  2002    GI bleed, ulcers    BACK SURGERY  2003    Dr Nicole Luciano instrumented fuson L4-L5 and L5-S1    BACK SURGERY  December 2012    diskectomy, fusion, removal of screws    BACK SURGERY  6/2015    CATARACT REMOVAL Right 2019    OTHER SURGICAL HISTORY  2002    stents       Family History   Problem Relation Age of Onset    Cancer Mother     Kidney Disease Father        Social History     Socioeconomic History    Marital status:      Spouse name: None    Number of children: None    Years of education: None    Highest education level: None   Occupational History    None   Tobacco Use    Smoking status: Current Every Day Smoker     Packs/day: 0.25     Years: 15.00     Pack years: 3.75     Types: Cigarettes    Smokeless tobacco: Never Used    Tobacco comment: smokes 4 cigarettes per day   Vaping Use    Vaping Use: Never used   Substance and Sexual Activity    Alcohol use: Yes     Alcohol/week: 0.0 standard drinks     Comment: rare    Drug use: No    Sexual activity: None   Other Topics Concern    None   Social History Narrative    None     Social Determinants of Health     Financial Resource Strain:     Difficulty of Paying Living Expenses: Not on file   Food Insecurity:     Worried About Running Out of Food in the Last Year: Not on file    Wendi of Food in the Last Year: Not on file   Transportation Needs:     Lack of Transportation (Medical): Not on file    Lack of Transportation (Non-Medical):  Not on file   Physical Activity:     Days of Exercise per Week: Not on file    Minutes of Exercise per Session: Not on file   Stress:     Feeling of Stress : Not on file   Social Connections:     Frequency of Communication with Friends and Family: Not on file    Frequency of Social Gatherings with Friends and Family: Not on file    Attends Lutheran Services: Not on file    Active Member of Clubs or Organizations: Not on file    Attends Club or Organization Meetings: Not on file    Marital Status: Not on file   Intimate Partner Violence:     Fear of Current or Ex-Partner: Not on file    Emotionally Abused: Not on file    Physically Abused: Not on file    Sexually Abused: Not on file   Housing Stability:     Unable to Pay for Housing in the Last Year: Not on file    Number of [place] in the Last Year: Not on file    Unstable Housing in the Last Year: Not on file           Allergies   Allergen Reactions    Dust Mite Extract     Nsaids      Abdominal pain    Pollen Extract          Review of Systems   Constitutional: Positive for fatigue and unexpected weight change. HENT: Negative. Eyes: Negative. Respiratory: Negative. Cardiovascular: Negative. Gastrointestinal: Negative. Endocrine: Negative. Genitourinary: Positive for frequency. Musculoskeletal: Positive for back pain and myalgias. Skin: Negative. Allergic/Immunologic: Negative. Neurological: Positive for weakness and numbness. Hematological: Negative. Psychiatric/Behavioral: Negative. Objective:   Physical Exam  Vitals:    12/30/21 1204   BP: (!) 152/92   Pulse: 94   Temp:      Constitutional .General appearance in no acute distress    Ears/Nose/Throat. Prominent soft palate and uvula. Base of tongue large         Neck large  Respiratory . Breath sounds clear bilaterally  Cardiovascular. Regular rate and rhythm and normal heart sounds  Muskuloskeletal.Normal tone. Muscle strength grossly normal nonfocal.   Gait and station normal  Orientation and mood. Alert and oriented  Short term memory normal  Attention and concentration normal   Language and speech. Normal quality with no aphasia  Cranial neve 2.  Fields intact to confrontation  Cranial nerve 3,4 and 6. Extraocular movements intact. Pupils equal round and reactive to light  Cranial Nerve 7. Normal exam  Sensation . Intact to pin prick  Deep tendon reflexes intact     Assessment:       1. Restless leg syndrome, controlled    2. Chronic bilateral low back pain with sciatica, sciatica laterality unspecified    3.  Insomnia, unspecified type    He is to stay on current medication regimen     Plan:      As above

## 2022-01-12 DIAGNOSIS — G47.00 INSOMNIA, UNSPECIFIED TYPE: ICD-10-CM

## 2022-01-12 RX ORDER — TEMAZEPAM 30 MG/1
CAPSULE ORAL
Qty: 30 CAPSULE | Refills: 0 | Status: SHIPPED | OUTPATIENT
Start: 2022-01-12 | End: 2022-02-10

## 2022-01-12 NOTE — TELEPHONE ENCOUNTER
Pharmacy requesting refill of temazepam.      Medication active on med list yes      Date of last fill: 12/10/21  with 0 refills verified on 1/12/22  verified by NELSON BA      Date of last appointment 12/30/21    Next Visit Date:  6/21/2022

## 2022-03-15 DIAGNOSIS — G47.00 INSOMNIA, UNSPECIFIED TYPE: ICD-10-CM

## 2022-03-15 NOTE — TELEPHONE ENCOUNTER
Pharmacy requesting a  refill of: Restoril 30mg     Medication active on med list yes     Date of last prescription: 02/11/2022  with 0  refills verified on 03/15/2022  verified by JOAQUÍN BYRD     Date of last appointment: 12/30/2021     Next Visit Date:  06/21/2022

## 2022-03-16 RX ORDER — TEMAZEPAM 30 MG/1
CAPSULE ORAL
Qty: 30 CAPSULE | Refills: 0 | Status: SHIPPED | OUTPATIENT
Start: 2022-03-16 | End: 2022-04-18

## 2022-04-15 DIAGNOSIS — G47.00 INSOMNIA, UNSPECIFIED TYPE: ICD-10-CM

## 2022-04-15 NOTE — TELEPHONE ENCOUNTER
Pharmacy requesting refill of temazepam.      Medication active on med list yes      Date of last fill: 3/16/22  with 0 refills verified on 4/15/22  verified by NELSON BA      Date of last appointment 12/30/21    Next Visit Date: 6/21/22

## 2022-04-18 RX ORDER — TEMAZEPAM 30 MG/1
CAPSULE ORAL
Qty: 30 CAPSULE | Refills: 0 | Status: SHIPPED | OUTPATIENT
Start: 2022-04-18 | End: 2022-05-25

## 2022-04-22 DIAGNOSIS — G47.00 INSOMNIA, UNSPECIFIED TYPE: ICD-10-CM

## 2022-04-22 RX ORDER — TRAZODONE HYDROCHLORIDE 100 MG/1
TABLET ORAL
Qty: 90 TABLET | Refills: 3 | Status: SHIPPED | OUTPATIENT
Start: 2022-04-22 | End: 2022-09-24

## 2022-04-22 RX ORDER — TEMAZEPAM 30 MG/1
CAPSULE ORAL
Qty: 30 CAPSULE | Refills: 0 | OUTPATIENT
Start: 2022-04-22 | End: 2022-05-22

## 2022-04-22 NOTE — TELEPHONE ENCOUNTER
Pharmacy requesting a  refill of: Trazodone 100mg     Medication active on med list yes     Date of last prescription: 10/13/2021  with 5  refills verified on 04/22/2022  verified by JOAQUÍN BYRD     Date of last appointment: 12/30/2021     Next Visit Date:  06/21/2022

## 2022-04-26 RX ORDER — GABAPENTIN 600 MG/1
TABLET ORAL
Qty: 90 TABLET | Refills: 1 | Status: SHIPPED | OUTPATIENT
Start: 2022-04-26 | End: 2022-07-14

## 2022-04-26 NOTE — TELEPHONE ENCOUNTER
Pharmacy requesting refill of gabapentin 600 mg.       Medication active on med list yes      Date of last Rx: 10/13/2022 with 2 refills          verified by ALMA, CLINTON      Date of last appointment 10/23/2021    Next Visit Date:  6/21/2022

## 2022-05-18 RX ORDER — ROPINIROLE 2 MG/1
TABLET, FILM COATED ORAL
Qty: 120 TABLET | Refills: 1 | Status: SHIPPED | OUTPATIENT
Start: 2022-05-18 | End: 2022-07-25

## 2022-05-18 NOTE — TELEPHONE ENCOUNTER
Pharmacy requesting refill of Requip 2 mg.       Medication active on med list yes      Date of last Rx: 10/13/2021 with 5 refills          verified by CLINTON MARQUEZ      Date of last appointment 12/30/2021    Next Visit Date:  6/21/2022

## 2022-05-25 DIAGNOSIS — G47.00 INSOMNIA, UNSPECIFIED TYPE: ICD-10-CM

## 2022-05-25 RX ORDER — TEMAZEPAM 30 MG/1
CAPSULE ORAL
Qty: 30 CAPSULE | Refills: 5 | Status: SHIPPED | OUTPATIENT
Start: 2022-05-25 | End: 2022-06-24

## 2022-05-25 NOTE — TELEPHONE ENCOUNTER
Pharmacy requesting a  refill of: Restoril  30mg     Medication active on med list yes     Date of last prescription: 04/18/2022  with 0  refills verified on 05/25/2022  verified by JOAQUÍN BYRD     Date of last appointment: 12/30/2021     Next Visit Date:  06/21/2022

## 2022-07-14 RX ORDER — GABAPENTIN 600 MG/1
TABLET ORAL
Qty: 90 TABLET | Refills: 0 | Status: SHIPPED | OUTPATIENT
Start: 2022-07-14 | End: 2022-08-30

## 2022-07-14 NOTE — TELEPHONE ENCOUNTER
Pharmacy requesting refill of gabapentin 600 mg.       Medication active on med list yes      Date of last Rx: 4/26/2021 with 1 refills          verified by CLINTON MARQUEZ      Date of last appointment 12/30/2021    Next Visit Date:  Visit date not found

## 2022-07-24 DIAGNOSIS — G25.81 RESTLESS LEG SYNDROME, CONTROLLED: Primary | ICD-10-CM

## 2022-07-25 RX ORDER — ROPINIROLE 2 MG/1
TABLET, FILM COATED ORAL
Qty: 120 TABLET | Refills: 11 | Status: SHIPPED | OUTPATIENT
Start: 2022-07-25

## 2022-07-25 NOTE — TELEPHONE ENCOUNTER
Pharmacy requesting a  refill of: Requip 2mg     Medication active on med list yes     Date of last prescription: 05/18/2022  with  1  refills verified on 07/25/2022  verified by JOAQUÍN BYRD     Date of last appointment: 12/30/2021     Next Visit Date:  None, message left to schedule follow up appointment.

## 2022-08-29 NOTE — TELEPHONE ENCOUNTER
*Please send for Dr. Demetria Cowan requesting refill of gabapentin 600 mg.       Medication active on med list yes      Date of last Rx: 7/14/2022 with 0 refills          verified by SB, RMA      Date of last appointment 12/30/2021    Next Visit Date:  11/16/2022

## 2022-08-30 RX ORDER — GABAPENTIN 600 MG/1
TABLET ORAL
Qty: 90 TABLET | Refills: 0 | Status: SHIPPED | OUTPATIENT
Start: 2022-08-30 | End: 2022-10-11

## 2022-09-22 DIAGNOSIS — G47.00 INSOMNIA, UNSPECIFIED TYPE: ICD-10-CM

## 2022-09-24 RX ORDER — TRAZODONE HYDROCHLORIDE 100 MG/1
TABLET ORAL
Qty: 90 TABLET | Refills: 1 | Status: SHIPPED | OUTPATIENT
Start: 2022-09-24

## 2022-10-11 RX ORDER — GABAPENTIN 600 MG/1
TABLET ORAL
Qty: 90 TABLET | Refills: 0 | Status: SHIPPED | OUTPATIENT
Start: 2022-10-11 | End: 2023-01-10

## 2022-10-11 NOTE — TELEPHONE ENCOUNTER
Pharmacy requesting refill of Gabapentin.       Medication active on med list yes      Date of last fill: 8/30/22 verified on 10/11/2022   verified by [facility] LPN      Date of last appointment 12/30/21    Next Visit Date:  11/16/22

## 2022-11-16 ENCOUNTER — OFFICE VISIT (OUTPATIENT)
Dept: NEUROLOGY | Age: 67
End: 2022-11-16
Payer: MEDICARE

## 2022-11-16 VITALS
DIASTOLIC BLOOD PRESSURE: 63 MMHG | WEIGHT: 139 LBS | HEART RATE: 122 BPM | HEIGHT: 65 IN | BODY MASS INDEX: 23.16 KG/M2 | SYSTOLIC BLOOD PRESSURE: 93 MMHG

## 2022-11-16 DIAGNOSIS — G47.00 INSOMNIA, UNSPECIFIED TYPE: ICD-10-CM

## 2022-11-16 DIAGNOSIS — S06.0X1D CEREBRAL CONCUSSION, WITH LOSS OF CONSCIOUSNESS OF 30 MINUTES OR LESS, SUBSEQUENT ENCOUNTER: ICD-10-CM

## 2022-11-16 DIAGNOSIS — M54.40 CHRONIC BILATERAL LOW BACK PAIN WITH SCIATICA, SCIATICA LATERALITY UNSPECIFIED: ICD-10-CM

## 2022-11-16 DIAGNOSIS — G25.81 RESTLESS LEG SYNDROME, CONTROLLED: Primary | ICD-10-CM

## 2022-11-16 DIAGNOSIS — G89.29 CHRONIC BILATERAL LOW BACK PAIN WITH SCIATICA, SCIATICA LATERALITY UNSPECIFIED: ICD-10-CM

## 2022-11-16 PROCEDURE — 99214 OFFICE O/P EST MOD 30 MIN: CPT | Performed by: PSYCHIATRY & NEUROLOGY

## 2022-11-16 PROCEDURE — 3017F COLORECTAL CA SCREEN DOC REV: CPT | Performed by: PSYCHIATRY & NEUROLOGY

## 2022-11-16 PROCEDURE — 4004F PT TOBACCO SCREEN RCVD TLK: CPT | Performed by: PSYCHIATRY & NEUROLOGY

## 2022-11-16 PROCEDURE — 1123F ACP DISCUSS/DSCN MKR DOCD: CPT | Performed by: PSYCHIATRY & NEUROLOGY

## 2022-11-16 PROCEDURE — G8427 DOCREV CUR MEDS BY ELIG CLIN: HCPCS | Performed by: PSYCHIATRY & NEUROLOGY

## 2022-11-16 PROCEDURE — G8420 CALC BMI NORM PARAMETERS: HCPCS | Performed by: PSYCHIATRY & NEUROLOGY

## 2022-11-16 PROCEDURE — G8484 FLU IMMUNIZE NO ADMIN: HCPCS | Performed by: PSYCHIATRY & NEUROLOGY

## 2022-11-16 RX ORDER — TRAMADOL HYDROCHLORIDE 50 MG/1
TABLET ORAL
COMMUNITY
Start: 2021-10-06

## 2022-11-16 RX ORDER — CARVEDILOL 3.12 MG/1
TABLET ORAL
COMMUNITY
Start: 2022-08-31

## 2022-11-16 RX ORDER — ATORVASTATIN CALCIUM 40 MG/1
TABLET, FILM COATED ORAL
COMMUNITY
Start: 2022-10-26

## 2022-11-16 RX ORDER — TEMAZEPAM 30 MG/1
CAPSULE ORAL
Qty: 30 CAPSULE | Refills: 2 | Status: SHIPPED | OUTPATIENT
Start: 2022-11-16 | End: 2023-02-15

## 2022-11-16 RX ORDER — SODIUM CHLORIDE 1000 MG
2000 TABLET, SOLUBLE MISCELLANEOUS 4 TIMES DAILY
COMMUNITY
Start: 2022-11-08

## 2022-11-16 RX ORDER — DEXAMETHASONE 4 MG/1
TABLET ORAL
COMMUNITY
Start: 2022-11-07

## 2022-11-16 RX ORDER — GABAPENTIN 600 MG/1
TABLET ORAL
Qty: 120 TABLET | Refills: 5 | Status: SHIPPED | OUTPATIENT
Start: 2022-11-16 | End: 2023-05-15

## 2022-11-16 RX ORDER — TRAZODONE HYDROCHLORIDE 100 MG/1
TABLET ORAL
Qty: 90 TABLET | Refills: 5 | Status: SHIPPED | OUTPATIENT
Start: 2022-11-16

## 2022-11-16 RX ORDER — INSULIN LISPRO 100 [IU]/ML
INJECTION, SOLUTION INTRAVENOUS; SUBCUTANEOUS
COMMUNITY
Start: 2022-07-05

## 2022-11-16 RX ORDER — ROPINIROLE 2 MG/1
TABLET, FILM COATED ORAL
Qty: 120 TABLET | Refills: 11 | Status: CANCELLED | OUTPATIENT
Start: 2022-11-16

## 2022-11-16 RX ORDER — TEMAZEPAM 30 MG/1
CAPSULE ORAL
COMMUNITY
Start: 2022-11-04 | End: 2022-11-16 | Stop reason: SDUPTHER

## 2022-11-16 RX ORDER — GABAPENTIN 300 MG/1
CAPSULE ORAL
Qty: 60 CAPSULE | Refills: 5 | Status: CANCELLED | OUTPATIENT
Start: 2022-11-16 | End: 2024-01-31

## 2022-11-16 RX ORDER — ROPINIROLE 2 MG/1
TABLET, FILM COATED ORAL
Qty: 150 TABLET | Refills: 11 | Status: SHIPPED | OUTPATIENT
Start: 2022-11-16

## 2022-11-16 RX ORDER — FUROSEMIDE 40 MG/1
TABLET ORAL
COMMUNITY
Start: 2022-11-09

## 2022-11-16 ASSESSMENT — ENCOUNTER SYMPTOMS
RESPIRATORY NEGATIVE: 1
GASTROINTESTINAL NEGATIVE: 1
EYES NEGATIVE: 1
ALLERGIC/IMMUNOLOGIC NEGATIVE: 1
BACK PAIN: 1
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
